# Patient Record
Sex: FEMALE | Race: WHITE | NOT HISPANIC OR LATINO | Employment: FULL TIME | URBAN - METROPOLITAN AREA
[De-identification: names, ages, dates, MRNs, and addresses within clinical notes are randomized per-mention and may not be internally consistent; named-entity substitution may affect disease eponyms.]

---

## 2023-02-19 PROBLEM — S16.1XXA NECK STRAIN: Status: ACTIVE | Noted: 2023-02-19

## 2023-02-19 PROBLEM — E66.01 MORBID OBESITY (MULTI): Status: ACTIVE | Noted: 2023-02-19

## 2023-02-19 PROBLEM — M54.9 CHRONIC BACK PAIN: Status: ACTIVE | Noted: 2023-02-19
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
## 2023-02-19 PROBLEM — N88.2 STENOTIC CERVICAL OS: Status: ACTIVE | Noted: 2023-02-19

## 2023-02-19 PROBLEM — E78.5 HYPERLIPIDEMIA: Status: ACTIVE | Noted: 2023-02-19

## 2023-02-19 PROBLEM — E66.813 CLASS 3 SEVERE OBESITY DUE TO EXCESS CALORIES WITH SERIOUS COMORBIDITY AND BODY MASS INDEX (BMI) OF 40.0 TO 44.9 IN ADULT: Status: ACTIVE | Noted: 2023-02-19

## 2023-02-19 PROBLEM — M54.2 ACUTE NECK PAIN: Status: ACTIVE | Noted: 2023-02-19

## 2023-02-19 PROBLEM — M62.830 BACK SPASM: Status: ACTIVE | Noted: 2023-02-19

## 2023-02-19 PROBLEM — E03.9 HYPOTHYROIDISM: Status: ACTIVE | Noted: 2023-02-19

## 2023-02-19 PROBLEM — N92.6 IRREGULAR BLEEDING: Status: ACTIVE | Noted: 2023-02-19

## 2023-02-19 PROBLEM — M54.30 SCIATICA: Status: ACTIVE | Noted: 2023-02-19

## 2023-02-19 PROBLEM — R53.83 FATIGUE: Status: ACTIVE | Noted: 2023-02-19
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
## 2023-02-19 PROBLEM — D22.9 MULTIPLE NEVI: Status: ACTIVE | Noted: 2023-02-19

## 2023-02-19 PROBLEM — G47.19 EXCESSIVE DAYTIME SLEEPINESS: Status: ACTIVE | Noted: 2023-02-19

## 2023-02-19 PROBLEM — G47.30 SLEEP APNEA: Status: ACTIVE | Noted: 2023-02-19

## 2023-02-19 PROBLEM — G47.33 OSA (OBSTRUCTIVE SLEEP APNEA): Status: ACTIVE | Noted: 2023-02-19

## 2023-02-19 PROBLEM — N95.0 PMB (POSTMENOPAUSAL BLEEDING): Status: ACTIVE | Noted: 2023-02-19

## 2023-02-19 PROBLEM — G62.9 PERIPHERAL NEUROPATHY: Status: ACTIVE | Noted: 2023-02-19

## 2023-02-19 PROBLEM — R82.90 ABNORMAL URINALYSIS: Status: ACTIVE | Noted: 2023-02-19

## 2023-02-19 PROBLEM — G89.29 CHRONIC NECK PAIN: Status: ACTIVE | Noted: 2023-02-19

## 2023-02-19 PROBLEM — R74.8 ELEVATED LIVER ENZYMES: Status: ACTIVE | Noted: 2023-02-19

## 2023-02-19 PROBLEM — G89.29 CHRONIC BACK PAIN: Status: ACTIVE | Noted: 2023-02-19

## 2023-02-19 PROBLEM — E55.9 VITAMIN D DEFICIENCY: Status: ACTIVE | Noted: 2023-02-19

## 2023-02-19 PROBLEM — L29.9 PRURITUS OF SKIN: Status: ACTIVE | Noted: 2023-02-19

## 2023-02-19 PROBLEM — J45.998 POST-VIRAL REACTIVE AIRWAY DISEASE (HHS-HCC): Status: ACTIVE | Noted: 2023-02-19

## 2023-02-19 PROBLEM — M54.12 CERVICAL RADICULAR PAIN: Status: ACTIVE | Noted: 2023-02-19

## 2023-02-19 PROBLEM — M54.2 CHRONIC NECK PAIN: Status: ACTIVE | Noted: 2023-02-19

## 2023-02-19 PROBLEM — R07.89 ATYPICAL CHEST PAIN: Status: ACTIVE | Noted: 2023-02-19

## 2023-02-19 PROBLEM — R06.83 SNORING: Status: ACTIVE | Noted: 2023-02-19

## 2023-02-19 PROBLEM — R79.89 ELEVATED LIVER FUNCTION TESTS: Status: ACTIVE | Noted: 2023-02-19

## 2023-02-19 PROBLEM — L24.9 IRRITANT CONTACT DERMATITIS: Status: ACTIVE | Noted: 2023-02-19

## 2023-02-19 PROBLEM — E11.9 DIABETES MELLITUS, TYPE 2 (MULTI): Status: ACTIVE | Noted: 2023-02-19

## 2023-02-19 PROBLEM — M25.569 KNEE PAIN: Status: ACTIVE | Noted: 2023-02-19

## 2023-02-19 PROBLEM — E66.01 CLASS 3 SEVERE OBESITY DUE TO EXCESS CALORIES WITH SERIOUS COMORBIDITY AND BODY MASS INDEX (BMI) OF 40.0 TO 44.9 IN ADULT (MULTI): Status: ACTIVE | Noted: 2023-02-19

## 2023-02-19 PROBLEM — S30.861A TICK BITE OF ABDOMEN: Status: ACTIVE | Noted: 2023-02-19

## 2023-02-19 PROBLEM — E11.9 TYPE 2 DIABETES MELLITUS (MULTI): Status: ACTIVE | Noted: 2023-02-19

## 2023-02-19 PROBLEM — R40.0 HAS DAYTIME DROWSINESS: Status: ACTIVE | Noted: 2023-02-19

## 2023-02-19 PROBLEM — R03.0 ELEVATED BP WITHOUT DIAGNOSIS OF HYPERTENSION: Status: ACTIVE | Noted: 2023-02-19

## 2023-02-19 RX ORDER — LEVOTHYROXINE SODIUM 125 UG/1
TABLET ORAL
COMMUNITY
Start: 2014-12-09 | End: 2023-03-10 | Stop reason: SDUPTHER

## 2023-02-19 RX ORDER — LOSARTAN POTASSIUM 25 MG/1
1 TABLET ORAL EVERY MORNING
COMMUNITY
Start: 2021-07-12 | End: 2023-03-10 | Stop reason: SDUPTHER

## 2023-02-19 RX ORDER — BLOOD-GLUCOSE METER
EACH MISCELLANEOUS
COMMUNITY
Start: 2020-03-11 | End: 2024-03-15 | Stop reason: WASHOUT

## 2023-02-19 RX ORDER — DULAGLUTIDE 3 MG/.5ML
1 INJECTION, SOLUTION SUBCUTANEOUS
COMMUNITY
Start: 2020-06-12 | End: 2023-03-10 | Stop reason: SDUPTHER

## 2023-02-19 RX ORDER — CYCLOBENZAPRINE HCL 10 MG
TABLET ORAL
COMMUNITY
Start: 2021-09-12 | End: 2023-03-10 | Stop reason: SDUPTHER

## 2023-02-19 RX ORDER — ERGOCALCIFEROL 1.25 MG/1
CAPSULE ORAL
COMMUNITY
Start: 2022-07-22

## 2023-02-19 RX ORDER — ATORVASTATIN CALCIUM 80 MG/1
1 TABLET, FILM COATED ORAL NIGHTLY
COMMUNITY
Start: 2019-03-19 | End: 2023-03-10 | Stop reason: SDUPTHER

## 2023-02-19 RX ORDER — METFORMIN HYDROCHLORIDE 500 MG/1
2 TABLET, EXTENDED RELEASE ORAL 2 TIMES DAILY
COMMUNITY
Start: 2019-03-19 | End: 2023-03-10 | Stop reason: SDUPTHER

## 2023-03-10 ENCOUNTER — OFFICE VISIT (OUTPATIENT)
Dept: PRIMARY CARE | Facility: CLINIC | Age: 63
End: 2023-03-10
Payer: COMMERCIAL

## 2023-03-10 VITALS
BODY MASS INDEX: 41.12 KG/M2 | HEIGHT: 57 IN | HEART RATE: 71 BPM | RESPIRATION RATE: 15 BRPM | TEMPERATURE: 98 F | SYSTOLIC BLOOD PRESSURE: 110 MMHG | WEIGHT: 190.6 LBS | DIASTOLIC BLOOD PRESSURE: 62 MMHG | OXYGEN SATURATION: 97 %

## 2023-03-10 DIAGNOSIS — M54.2 CERVICAL PAIN: ICD-10-CM

## 2023-03-10 DIAGNOSIS — E03.8 HYPOTHYROIDISM DUE TO HASHIMOTO'S THYROIDITIS: ICD-10-CM

## 2023-03-10 DIAGNOSIS — E66.01 CLASS 3 SEVERE OBESITY DUE TO EXCESS CALORIES WITH SERIOUS COMORBIDITY AND BODY MASS INDEX (BMI) OF 40.0 TO 44.9 IN ADULT (MULTI): ICD-10-CM

## 2023-03-10 DIAGNOSIS — E06.3 HYPOTHYROIDISM DUE TO HASHIMOTO'S THYROIDITIS: ICD-10-CM

## 2023-03-10 DIAGNOSIS — E11.69 TYPE 2 DIABETES MELLITUS WITH OTHER SPECIFIED COMPLICATION, WITHOUT LONG-TERM CURRENT USE OF INSULIN (MULTI): ICD-10-CM

## 2023-03-10 DIAGNOSIS — F43.10 PTSD (POST-TRAUMATIC STRESS DISORDER): Primary | ICD-10-CM

## 2023-03-10 LAB
POC FINGERSTICK BLOOD GLUCOSE: 139 MG/DL (ref 70–100)
POC HEMOGLOBIN A1C: 7 % (ref 4.2–6.5)

## 2023-03-10 PROCEDURE — 3008F BODY MASS INDEX DOCD: CPT | Performed by: NURSE PRACTITIONER

## 2023-03-10 PROCEDURE — 1036F TOBACCO NON-USER: CPT | Performed by: NURSE PRACTITIONER

## 2023-03-10 PROCEDURE — 3078F DIAST BP <80 MM HG: CPT | Performed by: NURSE PRACTITIONER

## 2023-03-10 PROCEDURE — 83036 HEMOGLOBIN GLYCOSYLATED A1C: CPT | Performed by: NURSE PRACTITIONER

## 2023-03-10 PROCEDURE — 82962 GLUCOSE BLOOD TEST: CPT | Performed by: NURSE PRACTITIONER

## 2023-03-10 PROCEDURE — 4010F ACE/ARB THERAPY RXD/TAKEN: CPT | Performed by: NURSE PRACTITIONER

## 2023-03-10 PROCEDURE — 99214 OFFICE O/P EST MOD 30 MIN: CPT | Performed by: NURSE PRACTITIONER

## 2023-03-10 PROCEDURE — 3074F SYST BP LT 130 MM HG: CPT | Performed by: NURSE PRACTITIONER

## 2023-03-10 RX ORDER — DULAGLUTIDE 3 MG/.5ML
INJECTION, SOLUTION SUBCUTANEOUS
Qty: 12.85 ML | Refills: 2 | Status: SHIPPED | OUTPATIENT
Start: 2023-03-10 | End: 2023-11-22

## 2023-03-10 RX ORDER — METFORMIN HYDROCHLORIDE 500 MG/1
500 TABLET, EXTENDED RELEASE ORAL
Qty: 90 TABLET | Refills: 1 | Status: SHIPPED | OUTPATIENT
Start: 2023-03-10 | End: 2023-11-22

## 2023-03-10 RX ORDER — LOSARTAN POTASSIUM 25 MG/1
25 TABLET ORAL EVERY MORNING
Qty: 90 TABLET | Refills: 1 | Status: SHIPPED | OUTPATIENT
Start: 2023-03-10 | End: 2023-11-22

## 2023-03-10 RX ORDER — ATORVASTATIN CALCIUM 80 MG/1
80 TABLET, FILM COATED ORAL NIGHTLY
Qty: 90 TABLET | Refills: 1 | Status: SHIPPED | OUTPATIENT
Start: 2023-03-10 | End: 2023-11-22

## 2023-03-10 RX ORDER — LEVOTHYROXINE SODIUM 125 UG/1
125 TABLET ORAL
Qty: 90 TABLET | Refills: 1 | Status: SHIPPED | OUTPATIENT
Start: 2023-03-10 | End: 2023-11-22

## 2023-03-10 RX ORDER — CYCLOBENZAPRINE HCL 10 MG
10 TABLET ORAL ONCE AS NEEDED
Qty: 90 TABLET | Refills: 1 | Status: SHIPPED | OUTPATIENT
Start: 2023-03-10 | End: 2023-03-27 | Stop reason: ENTERED-IN-ERROR

## 2023-03-10 ASSESSMENT — PATIENT HEALTH QUESTIONNAIRE - PHQ9
SUM OF ALL RESPONSES TO PHQ9 QUESTIONS 1 AND 2: 2
2. FEELING DOWN, DEPRESSED OR HOPELESS: SEVERAL DAYS
1. LITTLE INTEREST OR PLEASURE IN DOING THINGS: SEVERAL DAYS

## 2023-03-10 NOTE — PROGRESS NOTES
"Subjective   Patient ID: Cora Acosta is a 62 y.o. female who presents for Hypertension.    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   Constitutional:       Appearance: Normal appearance.   HENT:      Head: Normocephalic and atraumatic.      Right Ear: Tympanic membrane normal.      Left Ear: Tympanic membrane normal.      Nose: Nose normal.      Mouth/Throat:      Mouth: Mucous membranes are moist.   Eyes:      Extraocular Movements: Extraocular movements intact.      Conjunctiva/sclera: Conjunctivae normal.   Cardiovascular:      Rate and Rhythm: Normal rate and regular rhythm.      Pulses: Normal pulses.      Heart sounds: Normal heart sounds.   Pulmonary:      Effort: Pulmonary effort is normal.      Breath sounds: Normal breath sounds.   Abdominal:      General: Abdomen is flat. Bowel sounds are normal.      Palpations: Abdomen is soft.   Musculoskeletal:         General: Normal range of motion.      Cervical back: Normal range of motion and neck supple.   Skin:     General: Skin is warm and dry.   Neurological:      " General: No focal deficit present.      Mental Status: She is alert and oriented to person, place, and time. Mental status is at baseline.   Psychiatric:         Mood and Affect: Mood normal.         Behavior: Behavior normal.         Thought Content: Thought content normal.         Judgment: Judgment normal.     Assessment/Plan   Problem List Items Addressed This Visit          Endocrine/Metabolic    Diabetes mellitus, type 2 (CMS/HCC)    Relevant Medications    metFORMIN XR (Glucophage-XR) 500 mg 24 hr tablet    losartan (Cozaar) 25 mg tablet    dulaglutide (Trulicity) 3 mg/0.5 mL pen injector    atorvastatin (Lipitor) 80 mg tablet    Other Relevant Orders    POCT glycosylated hemoglobin (Hb A1C) manually resulted (Completed)    POCT Fingerstick glucose manually resulted (Completed)    Comprehensive Metabolic Panel    Cholesterol, LDL Direct    Urinalysis with Reflex Microscopic    Albumin , Urine Random    Follow Up In [facility] - PCP    Hypothyroidism    Relevant Medications    levothyroxine (Synthroid, Levoxyl) 125 mcg tablet    Other Relevant Orders    Follow Up In [facility] - PCP     Other Visit Diagnoses       PTSD (post-traumatic stress disorder)    -  Primary    Relevant Orders    Referral to Psychology    Follow Up In [facility] - PCP    Cervical pain        Relevant Medications    cyclobenzaprine (Flexeril) 10 mg tablet    Other Relevant Orders    Follow Up In [facility] - PCP

## 2023-03-10 NOTE — PATIENT INSTRUCTIONS
Think about the things you can be grateful for  Think about the future   Journal   Connect with people that love you   Your aic is 7 which is great  Going through your past is hard especially if parents have not been what they were supposed to be   You can do zoom calls  Look at the future which is always better than the past    Follow up in 4 to 6 months     There is a same day ortho injury clinic at [facility] the [facility] [address]  from [phone] to [phone]

## 2023-03-23 ENCOUNTER — TELEPHONE (OUTPATIENT)
Dept: PRIMARY CARE | Facility: CLINIC | Age: 63
End: 2023-03-23
Payer: COMMERCIAL

## 2023-03-23 DIAGNOSIS — J45.998 POST-VIRAL REACTIVE AIRWAY DISEASE (HHS-HCC): Primary | ICD-10-CM

## 2023-03-27 ENCOUNTER — TELEPHONE (OUTPATIENT)
Dept: PRIMARY CARE | Facility: CLINIC | Age: 63
End: 2023-03-27

## 2023-03-27 RX ORDER — CYCLOBENZAPRINE HCL 10 MG
10 TABLET ORAL NIGHTLY PRN
Qty: 30 TABLET | Refills: 1 | Status: SHIPPED | OUTPATIENT
Start: 2023-03-27 | End: 2024-03-15

## 2023-03-27 RX ORDER — ALBUTEROL SULFATE 90 UG/1
2 AEROSOL, METERED RESPIRATORY (INHALATION) EVERY 6 HOURS PRN
Qty: 18 G | Refills: 11 | Status: SHIPPED | OUTPATIENT
Start: 2023-03-27 | End: 2024-03-26

## 2023-03-27 NOTE — TELEPHONE ENCOUNTER
----- Message from Arlette Centeno MD sent at 3/27/2023 12:49 PM EDT -----  Regarding: RE: refill  I sent the albuterol inhaler to her pharmacy however let her know if she is having trouble breathing despite using albuterol inhaler she needs to be seen at emergency room or urgent care.  ----- Message -----  From: Lynne Soliman  Sent: 3/27/2023  12:34 PM EDT  To: Arlette Centeno MD  Subject: refill                                           Patient of Carlie    Patient stated that her son and her  has Covid and she has reactive airway asthma and is experiencing breathing issues that may be associated with COVID. Patient is requesting an inhaler she stated that she has not had an inhaler for 3 years. She previously used Albuterol.
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
## 2023-10-25 ENCOUNTER — APPOINTMENT (OUTPATIENT)
Dept: PRIMARY CARE | Facility: CLINIC | Age: 63
End: 2023-10-25
Payer: COMMERCIAL

## 2023-10-25 ENCOUNTER — TELEPHONE (OUTPATIENT)
Dept: PRIMARY CARE | Facility: CLINIC | Age: 63
End: 2023-10-25

## 2023-10-25 DIAGNOSIS — S90.219A CONTUSION OF GREAT TOE WITH DAMAGE TO NAIL, UNSPECIFIED LATERALITY, INITIAL ENCOUNTER: Primary | ICD-10-CM

## 2023-10-25 NOTE — TELEPHONE ENCOUNTER
" Pls call pt  Referral placed for podiatry  2. For med switch she needs an appt to discuss  3. Pls get more info for tick/ lymes  -when was exposure  - when she says \"exposure\" does that mean a tick was on her skin?  - Was she w/ a rash after tick exposure  - any symptoms of lyme's      "
"2. For med switch she needs an appt to discuss    Patient is requesting a 7:40 appt with you and does not want to wait for this. Please advise.      3. Pls get more info for tick/ lymes  -when was exposure----During Summer  - when she says \"exposure\" does that mean a tick was on her skin?-----She found several on her skin at different times.  - Was she w/ a rash after tick exposure-----no rash  - any symptoms of lyme's----aches, pains, headaches, fatigue  "
Patient came into the office. She was scheduled to see Carlie Rosa, today. Her appointment is rescheduled for 2/7/24. Out of the following medications:  atorvastatin (Lipitor) 80 mg tablet   levothyroxine (Synthroid, Levoxyl) 125 mcg tablet   [id number] - [facility] [address]   Phone: [phone]   Fax: [phone]   Please, refill.  
Patient was scheduled to see lanette Watson. She requests a  podiatry referral. Her great right toenail is coming off and she has ingrown toenails. Patient stated she was exposed to ticks and her son was diagnosed with lyme disease a month ago. She would like to be tested for lyme disease. Also, she would like to switch from Trulicity to Ozempic. If the switch is allowed, does she still have to take Metformin.   
No

## 2023-11-21 DIAGNOSIS — E03.8 HYPOTHYROIDISM DUE TO HASHIMOTO'S THYROIDITIS: ICD-10-CM

## 2023-11-21 DIAGNOSIS — E06.3 HYPOTHYROIDISM DUE TO HASHIMOTO'S THYROIDITIS: ICD-10-CM

## 2023-11-21 DIAGNOSIS — E11.69 TYPE 2 DIABETES MELLITUS WITH OTHER SPECIFIED COMPLICATION, WITHOUT LONG-TERM CURRENT USE OF INSULIN (MULTI): ICD-10-CM

## 2023-11-21 NOTE — TELEPHONE ENCOUNTER
Patient was told that her prescriptions were being sent to the pharmacy. The medications were never sent to the pharmacy and they need to be sent asap. She is currently out of medications. Thank you in advance.

## 2023-11-22 RX ORDER — LOSARTAN POTASSIUM 25 MG/1
25 TABLET ORAL EVERY MORNING
Qty: 90 TABLET | Refills: 0 | Status: SHIPPED | OUTPATIENT
Start: 2023-11-22

## 2023-11-22 RX ORDER — DULAGLUTIDE 3 MG/.5ML
INJECTION, SOLUTION SUBCUTANEOUS
Qty: 2 ML | Refills: 0 | Status: SHIPPED | OUTPATIENT
Start: 2023-11-22 | End: 2024-01-19 | Stop reason: SDUPTHER

## 2023-11-22 RX ORDER — LEVOTHYROXINE SODIUM 125 UG/1
TABLET ORAL
Qty: 90 TABLET | Refills: 0 | Status: SHIPPED | OUTPATIENT
Start: 2023-11-22 | End: 2024-02-20 | Stop reason: SDUPTHER

## 2023-11-22 RX ORDER — METFORMIN HYDROCHLORIDE 500 MG/1
TABLET, EXTENDED RELEASE ORAL
Qty: 90 TABLET | Refills: 0 | Status: SHIPPED | OUTPATIENT
Start: 2023-11-22 | End: 2024-03-26 | Stop reason: ALTCHOICE

## 2023-11-22 RX ORDER — ATORVASTATIN CALCIUM 80 MG/1
80 TABLET, FILM COATED ORAL NIGHTLY
Qty: 90 TABLET | Refills: 0 | Status: SHIPPED | OUTPATIENT
Start: 2023-11-22

## 2023-12-05 ENCOUNTER — OFFICE VISIT (OUTPATIENT)
Dept: PAIN MEDICINE | Facility: HOSPITAL | Age: 63
End: 2023-12-05
Payer: COMMERCIAL

## 2023-12-05 DIAGNOSIS — M54.12 CERVICAL RADICULAR PAIN: ICD-10-CM

## 2023-12-05 DIAGNOSIS — M25.529 ELBOW PAIN, UNSPECIFIED LATERALITY: ICD-10-CM

## 2023-12-05 PROCEDURE — 99214 OFFICE O/P EST MOD 30 MIN: CPT | Performed by: ANESTHESIOLOGY

## 2023-12-05 PROCEDURE — 3008F BODY MASS INDEX DOCD: CPT | Performed by: ANESTHESIOLOGY

## 2023-12-05 PROCEDURE — 4010F ACE/ARB THERAPY RXD/TAKEN: CPT | Performed by: ANESTHESIOLOGY

## 2023-12-05 PROCEDURE — 1036F TOBACCO NON-USER: CPT | Performed by: ANESTHESIOLOGY

## 2023-12-05 ASSESSMENT — PAIN SCALES - GENERAL: PAINLEVEL_OUTOF10: 6

## 2023-12-05 ASSESSMENT — PAIN - FUNCTIONAL ASSESSMENT: PAIN_FUNCTIONAL_ASSESSMENT: 0-10
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
## 2023-12-15 DIAGNOSIS — M62.830 BACK SPASM: ICD-10-CM

## 2023-12-15 RX ORDER — TRAMADOL HYDROCHLORIDE 50 MG/1
50 TABLET ORAL 3 TIMES DAILY PRN
Qty: 21 TABLET | Refills: 0 | Status: SHIPPED | OUTPATIENT
Start: 2023-12-15 | End: 2023-12-22

## 2023-12-22 ENCOUNTER — HOSPITAL ENCOUNTER (OUTPATIENT)
Dept: RADIOLOGY | Facility: HOSPITAL | Age: 63
Discharge: HOME | End: 2023-12-22
Payer: COMMERCIAL

## 2023-12-22 VITALS
RESPIRATION RATE: 16 BRPM | HEIGHT: 56 IN | OXYGEN SATURATION: 96 % | HEART RATE: 69 BPM | SYSTOLIC BLOOD PRESSURE: 134 MMHG | TEMPERATURE: 96.5 F | BODY MASS INDEX: 42.73 KG/M2 | DIASTOLIC BLOOD PRESSURE: 71 MMHG

## 2023-12-22 DIAGNOSIS — M54.12 CERVICAL RADICULAR PAIN: ICD-10-CM

## 2023-12-22 PROCEDURE — 2500000004 HC RX 250 GENERAL PHARMACY W/ HCPCS (ALT 636 FOR OP/ED): Performed by: ANESTHESIOLOGY

## 2023-12-22 PROCEDURE — 62321 NJX INTERLAMINAR CRV/THRC: CPT | Performed by: ANESTHESIOLOGY

## 2023-12-22 PROCEDURE — 77003 FLUOROGUIDE FOR SPINE INJECT: CPT

## 2023-12-22 RX ORDER — MIDAZOLAM HYDROCHLORIDE 1 MG/ML
INJECTION INTRAMUSCULAR; INTRAVENOUS
Status: COMPLETED | OUTPATIENT
Start: 2023-12-22 | End: 2023-12-22

## 2023-12-22 RX ADMIN — MIDAZOLAM HYDROCHLORIDE 1 MG: 1 INJECTION INTRAMUSCULAR; INTRAVENOUS at 08:23

## 2023-12-22 ASSESSMENT — PAIN - FUNCTIONAL ASSESSMENT: PAIN_FUNCTIONAL_ASSESSMENT: 0-10

## 2023-12-22 ASSESSMENT — PAIN SCALES - GENERAL
PAINLEVEL_OUTOF10: 9
PAINLEVEL_OUTOF10: 10 - WORST POSSIBLE PAIN
PAINLEVEL_OUTOF10: 2
PAINLEVEL_OUTOF10: 10 - WORST POSSIBLE PAIN
PAINLEVEL_OUTOF10: 3
PAINLEVEL_OUTOF10: 3

## 2023-12-22 NOTE — PROCEDURES
Preoperative diagnosis:  Cervical radiculitis  Postoperative diagnosis:  Cervical radiculitis  Procedure: Right biased C6-7 cervical epidural steroid injection under fluoroscopic guidance  Surgeon: Shannan Bautista  Assistant:  Fellow, Simon Quevedo  Anesthesia: Local, IV sedation  Complications: Apparently none    Clinical note: Cora Acosta is a 63-year-old female with a history of neck pain and probably right-sided symptoms who presents today for the aforementioned procedure.    Procedure note: The patient was met in the preoperative holding area after risks benefits and alternatives to procedure were discussed with the patient, informed consent was obtained. Patient brought back to the procedure room and placed in the prone position on the fluoroscopy table. Area over the neck was exposed, prepped, draped, in the usual sterile fashion.  Skin and subcutaneous tissues to the cervical intralaminar space was anesthetized using 0.5% lidocaine.  An 18-gauge Tuohy needle was inserted in the skin and advanced into the interlaminar space. A glass syringe was used to achieve the epidural space using the loss resistance technique there seem to be false loss, tip position was checked again in the AP view and moved more medially.  In the final position contrast was injected which showed appropriate epidural spread, no intravascular or intrathecal uptake. A total of 1 mL's of 0.5% lidocaine mixed with 40 mg methylprednisolone was injected. Needle removed, bandage applied, patient tolerated the procedure well with no immediate complications.
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
## 2024-01-04 DIAGNOSIS — M54.12 CERVICAL RADICULAR PAIN: ICD-10-CM

## 2024-01-05 DIAGNOSIS — M54.12 CERVICAL RADICULAR PAIN: ICD-10-CM

## 2024-01-07 ENCOUNTER — HOSPITAL ENCOUNTER (OUTPATIENT)
Dept: RADIOLOGY | Facility: HOSPITAL | Age: 64
Discharge: HOME | End: 2024-01-07
Payer: COMMERCIAL

## 2024-01-07 DIAGNOSIS — M54.12 CERVICAL RADICULAR PAIN: ICD-10-CM

## 2024-01-07 PROCEDURE — 72141 MRI NECK SPINE W/O DYE: CPT | Performed by: RADIOLOGY

## 2024-01-07 PROCEDURE — 72141 MRI NECK SPINE W/O DYE: CPT

## 2024-01-18 DIAGNOSIS — M54.12 CERVICAL RADICULAR PAIN: ICD-10-CM

## 2024-01-19 ENCOUNTER — OFFICE VISIT (OUTPATIENT)
Dept: PRIMARY CARE | Facility: CLINIC | Age: 64
End: 2024-01-19
Payer: COMMERCIAL

## 2024-01-19 ENCOUNTER — LAB (OUTPATIENT)
Dept: LAB | Facility: LAB | Age: 64
End: 2024-01-19
Payer: COMMERCIAL

## 2024-01-19 ENCOUNTER — OFFICE VISIT (OUTPATIENT)
Dept: ORTHOPEDIC SURGERY | Facility: CLINIC | Age: 64
End: 2024-01-19
Payer: COMMERCIAL

## 2024-01-19 VITALS
TEMPERATURE: 97.3 F | RESPIRATION RATE: 15 BRPM | BODY MASS INDEX: 43.71 KG/M2 | HEIGHT: 56 IN | OXYGEN SATURATION: 98 % | SYSTOLIC BLOOD PRESSURE: 132 MMHG | WEIGHT: 194.3 LBS | HEART RATE: 78 BPM | DIASTOLIC BLOOD PRESSURE: 78 MMHG

## 2024-01-19 VITALS — WEIGHT: 190 LBS | BODY MASS INDEX: 42.74 KG/M2 | HEIGHT: 56 IN

## 2024-01-19 DIAGNOSIS — F43.10 PTSD (POST-TRAUMATIC STRESS DISORDER): ICD-10-CM

## 2024-01-19 DIAGNOSIS — E55.9 VITAMIN D DEFICIENCY: Primary | ICD-10-CM

## 2024-01-19 DIAGNOSIS — F41.9 ANXIETY: ICD-10-CM

## 2024-01-19 DIAGNOSIS — M54.50 ACUTE RIGHT-SIDED LOW BACK PAIN WITHOUT SCIATICA: ICD-10-CM

## 2024-01-19 DIAGNOSIS — E11.69 TYPE 2 DIABETES MELLITUS WITH OTHER SPECIFIED COMPLICATION, WITHOUT LONG-TERM CURRENT USE OF INSULIN (MULTI): ICD-10-CM

## 2024-01-19 DIAGNOSIS — F33.2 SEVERE EPISODE OF RECURRENT MAJOR DEPRESSIVE DISORDER, WITHOUT PSYCHOTIC FEATURES (MULTI): ICD-10-CM

## 2024-01-19 DIAGNOSIS — M54.12 CERVICAL RADICULAR PAIN: ICD-10-CM

## 2024-01-19 DIAGNOSIS — M25.551 PAIN OF RIGHT HIP: ICD-10-CM

## 2024-01-19 DIAGNOSIS — E03.9 HYPOTHYROIDISM, UNSPECIFIED TYPE: ICD-10-CM

## 2024-01-19 DIAGNOSIS — M25.551 RIGHT HIP PAIN: Primary | ICD-10-CM

## 2024-01-19 PROBLEM — M25.529 ELBOW PAIN: Status: ACTIVE | Noted: 2024-01-19
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
## 2024-01-19 PROBLEM — J45.998 POST-VIRAL REACTIVE AIRWAY DISEASE (HHS-HCC): Status: RESOLVED | Noted: 2023-02-19 | Resolved: 2024-01-19

## 2024-01-19 PROBLEM — R73.03 PREDIABETES: Status: ACTIVE | Noted: 2024-01-19

## 2024-01-19 PROBLEM — Z86.59 HISTORY OF DEPRESSION: Status: ACTIVE | Noted: 2024-01-19

## 2024-01-19 PROBLEM — D48.0: Status: ACTIVE | Noted: 2024-01-19

## 2024-01-19 LAB
ALBUMIN SERPL BCP-MCNC: 4.1 G/DL (ref 3.4–5)
ALP SERPL-CCNC: 85 U/L (ref 33–136)
ALT SERPL W P-5'-P-CCNC: 29 U/L (ref 7–45)
ANION GAP SERPL CALC-SCNC: 15 MMOL/L (ref 10–20)
AST SERPL W P-5'-P-CCNC: 24 U/L (ref 9–39)
BILIRUB SERPL-MCNC: 0.5 MG/DL (ref 0–1.2)
BUN SERPL-MCNC: 14 MG/DL (ref 6–23)
CALCIUM SERPL-MCNC: 9.1 MG/DL (ref 8.6–10.6)
CHLORIDE SERPL-SCNC: 101 MMOL/L (ref 98–107)
CO2 SERPL-SCNC: 25 MMOL/L (ref 21–32)
CREAT SERPL-MCNC: 0.75 MG/DL (ref 0.5–1.05)
EGFRCR SERPLBLD CKD-EPI 2021: 90 ML/MIN/1.73M*2
GLUCOSE SERPL-MCNC: 176 MG/DL (ref 74–99)
LDLC SERPL DIRECT ASSAY-MCNC: 301 MG/DL (ref 0–129)
POC HEMOGLOBIN A1C: 11.9 % (ref 4.2–6.5)
POTASSIUM SERPL-SCNC: 4.1 MMOL/L (ref 3.5–5.3)
PROT SERPL-MCNC: 7 G/DL (ref 6.4–8.2)
SODIUM SERPL-SCNC: 137 MMOL/L (ref 136–145)
TSH SERPL-ACNC: 89.3 MIU/L (ref 0.44–3.98)

## 2024-01-19 PROCEDURE — 4010F ACE/ARB THERAPY RXD/TAKEN: CPT | Performed by: FAMILY MEDICINE

## 2024-01-19 PROCEDURE — 3075F SYST BP GE 130 - 139MM HG: CPT | Performed by: FAMILY MEDICINE

## 2024-01-19 PROCEDURE — 99215 OFFICE O/P EST HI 40 MIN: CPT | Performed by: FAMILY MEDICINE

## 2024-01-19 PROCEDURE — 3078F DIAST BP <80 MM HG: CPT | Performed by: FAMILY MEDICINE

## 2024-01-19 PROCEDURE — 83721 ASSAY OF BLOOD LIPOPROTEIN: CPT

## 2024-01-19 PROCEDURE — 3008F BODY MASS INDEX DOCD: CPT | Performed by: FAMILY MEDICINE

## 2024-01-19 PROCEDURE — 36415 COLL VENOUS BLD VENIPUNCTURE: CPT

## 2024-01-19 PROCEDURE — 83036 HEMOGLOBIN GLYCOSYLATED A1C: CPT | Performed by: FAMILY MEDICINE

## 2024-01-19 PROCEDURE — 3050F LDL-C >= 130 MG/DL: CPT | Performed by: ORTHOPAEDIC SURGERY

## 2024-01-19 PROCEDURE — 3008F BODY MASS INDEX DOCD: CPT | Performed by: ORTHOPAEDIC SURGERY

## 2024-01-19 PROCEDURE — 84443 ASSAY THYROID STIM HORMONE: CPT

## 2024-01-19 PROCEDURE — 99203 OFFICE O/P NEW LOW 30 MIN: CPT | Performed by: ORTHOPAEDIC SURGERY

## 2024-01-19 PROCEDURE — 4010F ACE/ARB THERAPY RXD/TAKEN: CPT | Performed by: ORTHOPAEDIC SURGERY

## 2024-01-19 PROCEDURE — 1036F TOBACCO NON-USER: CPT | Performed by: FAMILY MEDICINE

## 2024-01-19 PROCEDURE — 80053 COMPREHEN METABOLIC PANEL: CPT

## 2024-01-19 PROCEDURE — 1036F TOBACCO NON-USER: CPT | Performed by: ORTHOPAEDIC SURGERY

## 2024-01-19 PROCEDURE — 99417 PROLNG OP E/M EACH 15 MIN: CPT | Performed by: FAMILY MEDICINE

## 2024-01-19 RX ORDER — DULAGLUTIDE 3 MG/.5ML
INJECTION, SOLUTION SUBCUTANEOUS
Qty: 2 ML | Refills: 0 | Status: SHIPPED | OUTPATIENT
Start: 2024-01-19 | End: 2024-02-05 | Stop reason: DRUGHIGH

## 2024-01-19 RX ORDER — DULOXETIN HYDROCHLORIDE 30 MG/1
CAPSULE, DELAYED RELEASE ORAL
Qty: 180 CAPSULE | Refills: 0 | Status: SHIPPED | OUTPATIENT
Start: 2024-01-19 | End: 2024-05-22 | Stop reason: WASHOUT

## 2024-01-19 RX ORDER — CYANOCOBALAMIN (VITAMIN B-12) 500 MCG
TABLET ORAL
COMMUNITY

## 2024-01-19 ASSESSMENT — PATIENT HEALTH QUESTIONNAIRE - PHQ9
5. POOR APPETITE OR OVEREATING: NEARLY EVERY DAY
9. THOUGHTS THAT YOU WOULD BE BETTER OFF DEAD, OR OF HURTING YOURSELF: SEVERAL DAYS
4. FEELING TIRED OR HAVING LITTLE ENERGY: NEARLY EVERY DAY
7. TROUBLE CONCENTRATING ON THINGS, SUCH AS READING THE NEWSPAPER OR WATCHING TELEVISION: NEARLY EVERY DAY
6. FEELING BAD ABOUT YOURSELF - OR THAT YOU ARE A FAILURE OR HAVE LET YOURSELF OR YOUR FAMILY DOWN: NEARLY EVERY DAY
10. IF YOU CHECKED OFF ANY PROBLEMS, HOW DIFFICULT HAVE THESE PROBLEMS MADE IT FOR YOU TO DO YOUR WORK, TAKE CARE OF THINGS AT HOME, OR GET ALONG WITH OTHER PEOPLE: VERY DIFFICULT
3. TROUBLE FALLING OR STAYING ASLEEP OR SLEEPING TOO MUCH: MORE THAN HALF THE DAYS
8. MOVING OR SPEAKING SO SLOWLY THAT OTHER PEOPLE COULD HAVE NOTICED. OR THE OPPOSITE, BEING SO FIGETY OR RESTLESS THAT YOU HAVE BEEN MOVING AROUND A LOT MORE THAN USUAL: NEARLY EVERY DAY
2. FEELING DOWN, DEPRESSED OR HOPELESS: MORE THAN HALF THE DAYS
1. LITTLE INTEREST OR PLEASURE IN DOING THINGS: MORE THAN HALF THE DAYS
SUM OF ALL RESPONSES TO PHQ QUESTIONS 1-9: 22
SUM OF ALL RESPONSES TO PHQ9 QUESTIONS 1 AND 2: 4

## 2024-01-19 ASSESSMENT — PAIN - FUNCTIONAL ASSESSMENT: PAIN_FUNCTIONAL_ASSESSMENT: 0-10

## 2024-01-19 ASSESSMENT — PAIN SCALES - GENERAL: PAINLEVEL_OUTOF10: 7

## 2024-01-19 NOTE — LETTER
January 23, 2024     Loulou Rosa, APRN-CNP  [address]   [address]  [facility] [address]    Patient: Cora Acosta   YOB: 1960   Date of Visit: 1/19/2024       Dear Dr. Loulou Rosa, APRN-CNP:    Thank you for referring Cora Acosta to me for evaluation. Below are my notes for this consultation.  If you have questions, please do not hesitate to call me. I look forward to following your patient along with you.       Sincerely,     iMke Griffiths MD      CC: Shannan Bautista MD  ______________________________________________________________________________________    HPI:Cora Acosta is a 63-year-old woman, who comes in today, with approximately 2-month history of pain in her right shoulder.  The pain then radiates into the arms bilaterally with complaints of weakness.  She had a steroid injection with pain management.  She has not had physical therapy.  She denies any overt myelopathic symptoms.      ROS:  Reviewed on EMR and patient intake sheet.    PMH/SH:   Reviewed on EMR and patient intake sheet.    Exam:  Physical Exam    Constitutional: Well appearing; no acute distress  Eyes: pupils are equal and round  Psych: normal affect  Respiratory: non-labored breathing  Cardiovascular: regular rate and rhythm  GI: non-distended abdomen  Musculoskeletal: no pain with range of motion of the shoulders bilaterally; no signs of impingement  Neurologic: [4]/5 strength in the upper extremities bilaterally]; [negative Humphreys's]; [no hyper-reflexia]; negative Spurling's    Radiology:  MRI was personally reviewed.  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  She does have some subaxial kyphosis and moderate disc degeneration at C5-6 and C6-C7.  No severe cord compression noted.    Diagnosis:  Cervical radiculopathy    Assessment and Plan:   63-year-old woman, with cervical radiculopathy.  Currently, she is not myelopathic and her MRI does not demonstrate severe cord compression.  I recommended  physical therapy.  If her radicular symptoms persist and remain intolerable, then I will see her back to discuss any potential surgical intervention.    The patient was in agreement with the plan. At the end of the visit today, the patient felt that all questions had been answered satisfactorily.  The patient was pleased with the visit and very appreciative for the care rendered.     Thank you very much for the kind referral.  It is a privilege, and a pleasure, to partner with you in the care of your patients.  I would be delighted to assist you with any further consultations as needed.      Mike Griffiths MD    Chief of Spine Surgery, [facility]  Director of Spine Service, [facility]  , Department of Orthopaedics  [facility]  [address] [place] [address]  P: [phone]    This note was dictated with voice recognition software.  It has not been proofread for grammatical errors, typographical mistakes or other semantic inconsistencies.

## 2024-01-19 NOTE — PROGRESS NOTES
"Subjective   Patient ID: Cora Acosta is a 63 y.o. female who presents for Follow-up (Pt is following up a physical therapy referral for her right hip injury.).    HPI       Right hip/ back pain  Started  1-2 wks ago while at work  Started after getting a into a chair and felt her hip \"pop\" with pain  Now with pain in the lower glutal area/ back  Would like to try PT  Using cane which helps with balance and pain    DM  Hba1c 11.9 ( up from 7 on 3/7/23)  States has been with more depression and not using her medications as directed.  She is ready to restart    Depression worsened  Several illness in the family  Work is stressful as well  Denies any suicidal thoughts or ideas  Willing to start counseling as well as medications for help      Review of Systems  All systems reviewed and neg if not noted in the HPI above       Objective   /78 (Patient Position: Sitting)   Pulse 78   Temp 36.3 °C (97.3 °F)   Resp 15   Ht 1.422 m (4' 8\")   Wt 88.1 kg (194 lb 4.8 oz)   SpO2 98%   BMI 43.56 kg/m²     Physical Exam    Patient appears to be in mild to moderate pain, non-antalgic gait noted.   paraspinal muscles NTTP   No masses.  Painful and reduced LS ROM noted.   Straight leg raise is neg   TTP at the right gluteal area  Did not do heel toe  DTR's, motor strength and sensation normal.    Peripheral pulses are palpable.    Assessment/Plan   Problem List Items Addressed This Visit             ICD-10-CM    Diabetes mellitus, type 2 (CMS/HCC) E11.9    Relevant Medications  -Hemoglobin A1c increased to 11  - [facility] pharm referral  - restart dm meds    -refilled dulaglutide (Trulicity) 3 mg/0.5 mL pen injector    Other Relevant Orders    POCT glycosylated hemoglobin (Hb A1C) manually resulted (Completed)    Follow Up In [facility] - Pharmacy    Hypothyroidism E03.9    Relevant Orders    -Last thyroid function was abnormal and checked 2021   -Recheck TSH    Anxiety F41.9    Relevant Medications  - Placed a " referral to see the Psychology team.  - Please check out [email or web address] to find your very own counselor you would feel comfortable with.  -Today I taught you how to navigate the [email or web address] website so you can pick out your therapist    -Starting on DULoxetine (Cymbalta) 30 mg DR capsule    Other Relevant Orders    Referral to Psychology    TSH    Severe episode of recurrent major depressive disorder, without psychotic features (CMS/HCC) F33.2    Relevant Medications    - Placed a referral to see the Psychology team.  - Please check out [email or web address] to find your very own counselor you would feel comfortable with.  -Today I taught you how to navigate the [email or web address] website so you can pick out your therapist  -Starting  DULoxetine (Cymbalta) 30 mg DR capsule    Other Relevant Orders    Referral to Psychology    PTSD (post-traumatic stress disorder) F43.10    Relevant Medications    DULoxetine (Cymbalta) 30 mg DR capsule    Other Relevant Orders    Referral to Psychology    TSH     Other Visit Diagnoses         Codes    Right hip pain    -  Primary  -Needs Workmen's Comp. M25.551    Relevant Orders    -Referral to Occupational Medicine to help with Workmen's Comp.    Pain of right hip     M25.551    Relevant Orders    XR hip right with pelvis when performed 2 or 3 views    Acute right-sided low back pain without sciatica     M54.50    Relevant Orders    XR hip right with pelvis when performed 2 or 3 views    Referral to Physical Therapy  -Duloxetine will help with pain as well

## 2024-01-19 NOTE — PATIENT INSTRUCTIONS
Back pain  - PT ordered  - xray ordered  - Topical creams- Voltaren gel, Salonpas, Icy hot, Bio freeze, Capsaicin, Asper cream, or Tiger balm (these are all over the counter)   - ortho if not better  - Add duloxetine  - referral for occupation medication  for help with workens comp    Labs today    Anxiety/Depression/PTSD  - Placed a referral to see the Psychology team.  - Please check out [email or web address] to find your very own counselor you would feel comfortable with.  -Today I taught you how to navigate the [email or web address] website so you can pick out your therapist    Dm  - [facility] pharm referral  - restart dm meds        Please follow up in as Scheduled with PCP or as needed.       ** If labs or imaging ordered at today's visit, all the non-urgent results will be discussed at your next visit    If you have been referred for a special test or to a specialist please call  [phone] to schedule an appointment.  If you have any further questions, or if develop new or worsened symptoms, please give our office a call at [phone].

## 2024-01-22 DIAGNOSIS — E03.9 HYPOTHYROIDISM, UNSPECIFIED TYPE: Primary | ICD-10-CM

## 2024-01-23 NOTE — PROGRESS NOTES
HPI:Cora Acosta is a 63-year-old woman, who comes in today, with approximately 2-month history of pain in her right shoulder.  The pain then radiates into the arms bilaterally with complaints of weakness.  She had a steroid injection with pain management.  She has not had physical therapy.  She denies any overt myelopathic symptoms.      ROS:  Reviewed on EMR and patient intake sheet.    PMH/SH:   Reviewed on EMR and patient intake sheet.    Exam:  Physical Exam    Constitutional: Well appearing; no acute distress  Eyes: pupils are equal and round  Psych: normal affect  Respiratory: non-labored breathing  Cardiovascular: regular rate and rhythm  GI: non-distended abdomen  Musculoskeletal: no pain with range of motion of the shoulders bilaterally; no signs of impingement  Neurologic: [4]/5 strength in the upper extremities bilaterally]; [negative Humphreys's]; [no hyper-reflexia]; negative Spurling's    Radiology:  MRI was personally reviewed.  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  She does have some subaxial kyphosis and moderate disc degeneration at C5-6 and C6-C7.  No severe cord compression noted.    Diagnosis:  Cervical radiculopathy    Assessment and Plan:   63-year-old woman, with cervical radiculopathy.  Currently, she is not myelopathic and her MRI does not demonstrate severe cord compression.  I recommended physical therapy.  If her radicular symptoms persist and remain intolerable, then I will see her back to discuss any potential surgical intervention.    The patient was in agreement with the plan. At the end of the visit today, the patient felt that all questions had been answered satisfactorily.  The patient was pleased with the visit and very appreciative for the care rendered.     Thank you very much for the kind referral.  It is a privilege, and a pleasure, to partner with you in the care of your patients.  I would be delighted to assist you with any further consultations as needed.      Mike Dawson  MD Aye    Chief of Spine Surgery, [facility]  Director of Spine Service, [facility]  , Department of Orthopaedics  [facility] [employer]  [address] [place] [address]  P: [phone]    This note was dictated with voice recognition software.  It has not been proofread for grammatical errors, typographical mistakes or other semantic inconsistencies.

## 2024-01-25 ENCOUNTER — OFFICE VISIT (OUTPATIENT)
Dept: ORTHOPEDIC SURGERY | Facility: CLINIC | Age: 64
End: 2024-01-25
Payer: COMMERCIAL

## 2024-01-25 ENCOUNTER — HOSPITAL ENCOUNTER (OUTPATIENT)
Dept: RADIOLOGY | Facility: CLINIC | Age: 64
Discharge: HOME | End: 2024-01-25
Payer: COMMERCIAL

## 2024-01-25 VITALS — HEIGHT: 56 IN | BODY MASS INDEX: 42.74 KG/M2 | WEIGHT: 190 LBS

## 2024-01-25 DIAGNOSIS — M54.2 NECK PAIN: ICD-10-CM

## 2024-01-25 DIAGNOSIS — M54.12 CERVICAL RADICULOPATHY: Primary | ICD-10-CM

## 2024-01-25 DIAGNOSIS — M54.12 CERVICAL RADICULOPATHY: ICD-10-CM

## 2024-01-25 PROCEDURE — 72050 X-RAY EXAM NECK SPINE 4/5VWS: CPT | Performed by: RADIOLOGY

## 2024-01-25 PROCEDURE — 99204 OFFICE O/P NEW MOD 45 MIN: CPT | Performed by: ORTHOPAEDIC SURGERY

## 2024-01-25 PROCEDURE — 99214 OFFICE O/P EST MOD 30 MIN: CPT | Performed by: ORTHOPAEDIC SURGERY

## 2024-01-25 PROCEDURE — 3050F LDL-C >= 130 MG/DL: CPT | Performed by: ORTHOPAEDIC SURGERY

## 2024-01-25 PROCEDURE — 72050 X-RAY EXAM NECK SPINE 4/5VWS: CPT

## 2024-01-25 PROCEDURE — 3008F BODY MASS INDEX DOCD: CPT | Performed by: ORTHOPAEDIC SURGERY

## 2024-01-25 PROCEDURE — 4010F ACE/ARB THERAPY RXD/TAKEN: CPT | Performed by: ORTHOPAEDIC SURGERY

## 2024-01-25 PROCEDURE — 1036F TOBACCO NON-USER: CPT | Performed by: ORTHOPAEDIC SURGERY

## 2024-01-25 NOTE — PROGRESS NOTES
Cora Acosta is a 63 y.o. female who presents for Pain and New Patient Visit of the Neck (Neck Pain, B/L Arm Pain radiating to elbows - X-Rays Today, MRI @  per Pain Mgmt - C6-7 cervical epidural steroid injection under fluoroscopic guidance w/ Dr. Lu Bautista, had prior inj that helped more - Starts PT 2/19/24 ).    HPI:  63-year-old female here for new patient and second opinion visit.  Neck pain with arm pain going to elbows.  She denies any fever chills nausea vomiting night sweats.  She has no bowel or bladder complaints.  She has seen Dr. Griffiths for right shoulder and periscapular pain.  That note from January 19, 2024 was reviewed.    Physical exam:  Well-nourished, well kept.   Patient can rise from a seated position, can sit from a standing position. Can get up heels and toes she balances on the table.  Patient is tender in the paraspinal musculature of the cervical spine is range of motion is mildly decreased secondary to some pain and stiffness no weakness no instability to muscle strength. examination of the upper extremities reveals no point tenderness, swelling, or deformity.  Range of motion of the shoulders, elbows, wrists, and fingers are full without crepitance, instability, or exacerbation of pain. Strength is 5/5 throughout. no redness, abrasions, or lesions on the upper extremities bilaterally.  Gross sensation intact to the extremities.  Deep tendon reflexes 2+ and symmetric bilaterally.  Humphreys negative.  Affect normal.  Alert and oriented X 3.  Coordination normal.    Imaging studies:  X-rays of the cervical spine were obtained and reviewed today.  An MRI from January 7, 2024 was reviewed.    Assessment:  63-year-old female here for evaluation of neck pain and shoulder and upper arm pain.  This is been going on for about 3 months.  She has had a history of neck and shoulder pain in the past but had an injection in the past that took care of all her problem.  She was doing very good  until putting up Gunnar tree in Thanksgiving of 2023 then she started having burning shoulder parascapular and neck pain.  Most of her complaint is her right shoulder and right upper arm.  Having a little bit of left-sided pain as well but her primary complaint is right parascapular and shoulder and upper arm pain.  He is not having any pain in the forearms or hands or wrist, she has had a history of bilateral carpal tunnel repair in the past.  She had a recent steroid injection in her neck that gave her absolutely no relief, she was given a course of steroids which helped tremendously but took her A1c from 7 to 11.  On x-rays she has a severely degenerative neck.  Her MRI shows multiple levels of degeneration and stenosis.  Her C3-4 level looks to be autofused or close to it, the remaining levels in her neck also look very degenerative.  She is starting physical therapy soon but has not done anything up to this visit.  On physical exam when I rotate and manipulate her shoulder I do get a positive Hanson and Neer on the right.  This may be a combination of neck and shoulder.  This is an undiagnosed problem with uncertain prognosis.    Plan:  For complete plan and/or surgical details, please refer to Dr. Felder's portion of this split dictation.    In a face-to-face encounter, I performed a history and physical examination, discussed pertinent diagnostic studies if indicated, and discussed diagnosis and management strategies with both the patient and the midlevel provider.  I reviewed the midlevel's note and agree with the documented findings and plan of care.    Right shoulder pain and neck pain.  I think the shoulder pain is actually coming from her shoulder as she has positive Neer, positive Hanson and positive empty can.  She has a stiff shoulder.  Her neck is severely degenerative with congenital narrowing and near autofusion of C3-4.  However, I do not see any stenosis on the MRI of her neck.  I do not  think this is a surgical problem for her neck.  Will get her into some physical therapy and into a shoulder surgeon and she can follow-up with me on a as needed basis.

## 2024-01-30 ENCOUNTER — APPOINTMENT (OUTPATIENT)
Dept: PHARMACY | Facility: HOSPITAL | Age: 64
End: 2024-01-30
Payer: COMMERCIAL

## 2024-02-05 ENCOUNTER — TELEMEDICINE (OUTPATIENT)
Dept: PHARMACY | Facility: HOSPITAL | Age: 64
End: 2024-02-05
Payer: COMMERCIAL

## 2024-02-05 DIAGNOSIS — E11.69 TYPE 2 DIABETES MELLITUS WITH OTHER SPECIFIED COMPLICATION, WITHOUT LONG-TERM CURRENT USE OF INSULIN (MULTI): ICD-10-CM

## 2024-02-05 RX ORDER — DULAGLUTIDE 4.5 MG/.5ML
4.5 INJECTION, SOLUTION SUBCUTANEOUS
Qty: 2 ML | Refills: 2 | Status: SHIPPED | OUTPATIENT
Start: 2024-02-05 | End: 2024-03-15 | Stop reason: SDUPTHER

## 2024-02-05 NOTE — PROGRESS NOTES
Subjective     Patient ID: Cora Acosta is a 63 y.o. female who presents for Diabetes.    Referring Provider: Gayathri Davidson DO     Diabetes  She presents for her initial diabetic visit. She has type 2 diabetes mellitus. Her disease course has been worsening.     Diet: Reports doing well -     Exercise: Currently limited- has joined a gym- plans to start water exercises       Allergies   Allergen Reactions    Erythromycin GI Upset    Animal Dander Unknown    Codeine Nausea Only    Meperidine Nausea Only    Mold Unknown    Pollen Extracts Unknown       Objective     Current DM Pharmacotherapy:   Trulicity 3 mg/0.5 mL - once weekly   Metformin  mg - 1 tablet daily     SECONDARY PREVENTION  - Statin? Yes  - ACE-I/ARB? Yes  - Aspirin? No    Current monitoring regimen:   Patient is using: glucometer    Testing frequency: Never    SMBG Readings: Not available     Any episodes of hypoglycemia? No  Hypoglycemia awareness? Yes      Pertinent PMH Review:  - PMH of Pancreatitis: No  - PMH/FH of Medullary Thyroid Cancer: No  - PMH of Retinopathy: No  - PMH of Urinary Tract Infections: No    Lab Review  Lab Results   Component Value Date    BILITOT 0.5 01/19/2024    CALCIUM 9.1 01/19/2024    CO2 25 01/19/2024     01/19/2024    CREATININE 0.75 01/19/2024    GLUCOSE 176 (H) 01/19/2024    ALKPHOS 85 01/19/2024    K 4.1 01/19/2024    PROT 7.0 01/19/2024     01/19/2024    AST 24 01/19/2024    ALT 29 01/19/2024    BUN 14 01/19/2024    ANIONGAP 15 01/19/2024    ALBUMIN 4.1 01/19/2024     Lab Results   Component Value Date    CHOL 175 12/15/2020    HDL 51.7 12/15/2020     Lab Results   Component Value Date    HGBA1C 11.9 (A) 01/19/2024     The ASCVD Risk score (Sven WESLEY, et al., 2019) failed to calculate for the following reasons:    Cannot find a previous HDL lab    Cannot find a previous total cholesterol lab      Assessment/Plan     Problem List Items Addressed This Visit       Diabetes mellitus, type 2  (CMS/[facility])     Increase to Trulicity 4.5 mg/0.5 mL - once weekly (after finishing remaining 2 doses of Trulicity 3 mg)     Continue   Metformin  mg - 1 tablet daily    Plan to switch to SGLT-2i pending insurance coverage             Type 2 diabetes mellitus, is not at goal. Goal A1C: <7%    Follow up: I recommend diabetes care be 4 weeks.    Olesya StacyD [facility]  Clinical Pharmacy Specialist, Primary Care     Continue all meds under the continuation of care with the referring provider and clinical pharmacy team

## 2024-02-05 NOTE — ASSESSMENT & PLAN NOTE
Increase to Trulicity 4.5 mg/0.5 mL - once weekly (after finishing remaining 2 doses of Trulicity 3 mg)     Continue   Metformin  mg - 1 tablet daily    Plan to switch to SGLT-2i pending insurance coverage

## 2024-02-07 ENCOUNTER — APPOINTMENT (OUTPATIENT)
Dept: PRIMARY CARE | Facility: CLINIC | Age: 64
End: 2024-02-07
Payer: COMMERCIAL

## 2024-02-15 ENCOUNTER — TELEPHONE (OUTPATIENT)
Dept: PRIMARY CARE | Facility: CLINIC | Age: 64
End: 2024-02-15

## 2024-02-15 DIAGNOSIS — M25.511 CHRONIC RIGHT SHOULDER PAIN: Primary | ICD-10-CM

## 2024-02-15 DIAGNOSIS — G89.29 CHRONIC RIGHT SHOULDER PAIN: Primary | ICD-10-CM

## 2024-02-15 DIAGNOSIS — E11.9 TYPE 2 DIABETES MELLITUS WITHOUT COMPLICATION, WITHOUT LONG-TERM CURRENT USE OF INSULIN (MULTI): ICD-10-CM

## 2024-02-15 NOTE — TELEPHONE ENCOUNTER
Patient called in concerning a referral to Oscar Almeida [birth year] Malina Dominguez Dentist marilynn letter is needed  due to age they would like to get a marilynn for his health before these teeth removalsdue to frozen shoulder. Carlie I spoke to the patient she said that she is already seen Ortho and has already did Physical Therapy she needs a referral to nguyen   she is taking this serious what is going on with her

## 2024-02-19 ENCOUNTER — APPOINTMENT (OUTPATIENT)
Dept: PHYSICAL THERAPY | Facility: CLINIC | Age: 64
End: 2024-02-19
Payer: COMMERCIAL

## 2024-02-20 ENCOUNTER — TELEPHONE (OUTPATIENT)
Dept: PRIMARY CARE | Facility: CLINIC | Age: 64
End: 2024-02-20
Payer: COMMERCIAL

## 2024-02-20 DIAGNOSIS — E03.9 HYPOTHYROIDISM, UNSPECIFIED TYPE: Primary | ICD-10-CM

## 2024-02-20 DIAGNOSIS — E06.3 HYPOTHYROIDISM DUE TO HASHIMOTO'S THYROIDITIS: ICD-10-CM

## 2024-02-20 DIAGNOSIS — E03.8 HYPOTHYROIDISM DUE TO HASHIMOTO'S THYROIDITIS: ICD-10-CM

## 2024-02-20 DIAGNOSIS — E11.9 TYPE 2 DIABETES MELLITUS WITHOUT COMPLICATION, WITHOUT LONG-TERM CURRENT USE OF INSULIN (MULTI): ICD-10-CM

## 2024-02-20 RX ORDER — LEVOTHYROXINE SODIUM 125 UG/1
TABLET ORAL
Qty: 120 TABLET | Refills: 0 | Status: SHIPPED | OUTPATIENT
Start: 2024-02-20

## 2024-02-26 ENCOUNTER — APPOINTMENT (OUTPATIENT)
Dept: PHYSICAL THERAPY | Facility: CLINIC | Age: 64
End: 2024-02-26
Payer: COMMERCIAL

## 2024-03-04 ENCOUNTER — APPOINTMENT (OUTPATIENT)
Dept: PHARMACY | Facility: HOSPITAL | Age: 64
End: 2024-03-04
Payer: COMMERCIAL

## 2024-03-08 ENCOUNTER — APPOINTMENT (OUTPATIENT)
Dept: PRIMARY CARE | Facility: CLINIC | Age: 64
End: 2024-03-08
Payer: COMMERCIAL

## 2024-03-15 ENCOUNTER — OFFICE VISIT (OUTPATIENT)
Dept: PRIMARY CARE | Facility: CLINIC | Age: 64
End: 2024-03-15
Payer: COMMERCIAL

## 2024-03-15 VITALS
RESPIRATION RATE: 12 BRPM | HEART RATE: 86 BPM | BODY MASS INDEX: 42.47 KG/M2 | OXYGEN SATURATION: 99 % | SYSTOLIC BLOOD PRESSURE: 130 MMHG | TEMPERATURE: 97 F | DIASTOLIC BLOOD PRESSURE: 70 MMHG | WEIGHT: 188.8 LBS | HEIGHT: 56 IN

## 2024-03-15 DIAGNOSIS — F43.10 PTSD (POST-TRAUMATIC STRESS DISORDER): ICD-10-CM

## 2024-03-15 DIAGNOSIS — F41.9 ANXIETY: ICD-10-CM

## 2024-03-15 DIAGNOSIS — G89.4 CHRONIC PAIN SYNDROME: ICD-10-CM

## 2024-03-15 DIAGNOSIS — F33.2 SEVERE EPISODE OF RECURRENT MAJOR DEPRESSIVE DISORDER, WITHOUT PSYCHOTIC FEATURES (MULTI): ICD-10-CM

## 2024-03-15 DIAGNOSIS — E11.618 TYPE 2 DIABETES MELLITUS WITH OTHER DIABETIC ARTHROPATHY, WITHOUT LONG-TERM CURRENT USE OF INSULIN (MULTI): Primary | ICD-10-CM

## 2024-03-15 DIAGNOSIS — E03.8 HYPOTHYROIDISM DUE TO HASHIMOTO'S THYROIDITIS: ICD-10-CM

## 2024-03-15 DIAGNOSIS — E11.69 TYPE 2 DIABETES MELLITUS WITH OTHER SPECIFIED COMPLICATION, WITHOUT LONG-TERM CURRENT USE OF INSULIN (MULTI): ICD-10-CM

## 2024-03-15 DIAGNOSIS — E06.3 HYPOTHYROIDISM DUE TO HASHIMOTO'S THYROIDITIS: ICD-10-CM

## 2024-03-15 PROCEDURE — 4010F ACE/ARB THERAPY RXD/TAKEN: CPT | Performed by: NURSE PRACTITIONER

## 2024-03-15 PROCEDURE — 99215 OFFICE O/P EST HI 40 MIN: CPT | Performed by: NURSE PRACTITIONER

## 2024-03-15 PROCEDURE — 3075F SYST BP GE 130 - 139MM HG: CPT | Performed by: NURSE PRACTITIONER

## 2024-03-15 PROCEDURE — 3050F LDL-C >= 130 MG/DL: CPT | Performed by: NURSE PRACTITIONER

## 2024-03-15 PROCEDURE — 3078F DIAST BP <80 MM HG: CPT | Performed by: NURSE PRACTITIONER

## 2024-03-15 RX ORDER — DULAGLUTIDE 4.5 MG/.5ML
4.5 INJECTION, SOLUTION SUBCUTANEOUS
Qty: 2 ML | Refills: 2 | Status: SHIPPED | OUTPATIENT
Start: 2024-03-15

## 2024-03-15 RX ORDER — DULOXETIN HYDROCHLORIDE 60 MG/1
60 CAPSULE, DELAYED RELEASE ORAL DAILY
Qty: 90 CAPSULE | Refills: 0 | Status: SHIPPED | OUTPATIENT
Start: 2024-03-15 | End: 2024-05-22 | Stop reason: WASHOUT

## 2024-03-15 ASSESSMENT — PATIENT HEALTH QUESTIONNAIRE - PHQ9
1. LITTLE INTEREST OR PLEASURE IN DOING THINGS: NOT AT ALL
2. FEELING DOWN, DEPRESSED OR HOPELESS: NOT AT ALL
SUM OF ALL RESPONSES TO PHQ9 QUESTIONS 1 AND 2: 0

## 2024-03-15 NOTE — PATIENT INSTRUCTIONS
Focus on just a few things now so you can get a foundation of strength and stability   When you are ready you can  about the relationships ,the person has a disability image  issue perhaps   Talk to the ortho swapna about the shoulder MRI   Follow up in 2  months   The comprehensive pain management can give you ketamine influsions for chronic pain  L;et me know if you want that   I will write you what you need

## 2024-03-15 NOTE — PROGRESS NOTES
"Subjective   Patient ID: Cora Acosta is a 63 y.o. female who presents for Follow-up (Follow up- DM).    HPI   The patient has had a frozen right  shoulder but both shoulders hurt   She had a time before thanksgiving that things were bad at work and did not take any meds. She was told her frozen shoulder was due to her diabetes   She is better now   She was given a shot with pain medication  She took prednisone for her arms in December   Her last glucose  was high and aic level was 11   She is taking duloxetine for pain and depression   She has pain everywhere  She takes 3 naprosyn in the am and two at night   She had 3 stages of emotions   She was off her meds 2 to 3 months due to extreme stress   Her  is helping her more since she has a frozen shoulders , he drinks alcohol , his mother  recently   She had done shots in her knees and had dottie   She had her armsvover head for a few hours doing the Markr and that could have irritated the neck   She works in the lab at the [employer]   She needs a disability form filled so she has limitations at her job   She cannot lift heavy items , there are gallons of liquid that are difficult    she needs the restriction of  no picking up more than 9 lbs   She can lift the  partially  at the end of the day  Heat helps some   She is a lead histotechnologist   She joined a  [facility] gym they have a pool , it is on her way home  She had an mri of both shoulders 3/11/2024   I spent 35 minutes with the patient and 10 minutes writing the chart   Review of Systems  Negative except what was mentioned in the HPI       Objective   /70 (Patient Position: Sitting)   Pulse 86   Temp 36.1 °C (97 °F)   Resp 12   Ht 1.422 m (4' 8\")   Wt 85.6 kg (188 lb 12.8 oz)   SpO2 99%   BMI 42.33 kg/m²     Physical Exam  Vitals and nursing note reviewed.   Constitutional:       Appearance: Normal appearance.   HENT:      Head: Normocephalic and atraumatic.      Right Ear: " Tympanic membrane normal.      Left Ear: Tympanic membrane normal.      Nose: Nose normal.      Mouth/Throat:      Mouth: Mucous membranes are moist.   Eyes:      Extraocular Movements: Extraocular movements intact.      Conjunctiva/sclera: Conjunctivae normal.   Cardiovascular:      Rate and Rhythm: Normal rate and regular rhythm.      Pulses: Normal pulses.      Heart sounds: Normal heart sounds.   Pulmonary:      Effort: Pulmonary effort is normal.      Breath sounds: Normal breath sounds.   Abdominal:      General: Abdomen is flat. Bowel sounds are normal.      Palpations: Abdomen is soft.   Musculoskeletal:         General: Normal range of motion.      Cervical back: Normal range of motion and neck supple.      Comments: Unable to lift the right arm above 90 degrees   Tenderness over both shoulders and over the posterior cervical muscles    Skin:     General: Skin is warm and dry.   Neurological:      General: No focal deficit present.      Mental Status: She is alert and oriented to person, place, and time. Mental status is at baseline.   Psychiatric:         Behavior: Behavior normal.         Thought Content: Thought content normal.         Judgment: Judgment normal.      Comments: Appears to be emotionally numb          Assessment/Plan   Problem List Items Addressed This Visit             ICD-10-CM    Diabetes mellitus, type 2 (CMS/HCC) - Primary E11.9    Relevant Medications    dulaglutide (Trulicity) 4.5 mg/0.5 mL pen injector    Other Relevant Orders    Comprehensive Metabolic Panel    Hemoglobin A1C    Cholesterol, LDL Direct    Albumin, urine, random    Hypothyroidism E03.9    Relevant Orders    Tsh With Reflex To Free T4 If Abnormal    Anxiety F41.9    Severe episode of recurrent major depressive disorder, without psychotic features (CMS/HCC) F33.2    PTSD (post-traumatic stress disorder) F43.10     Other Visit Diagnoses         Codes    Chronic pain syndrome     G89.4    Relevant Medications     DULoxetine (Cymbalta) 60 mg DR capsule

## 2024-03-26 ENCOUNTER — APPOINTMENT (OUTPATIENT)
Dept: RADIOLOGY | Facility: CLINIC | Age: 64
End: 2024-03-26
Payer: COMMERCIAL

## 2024-03-26 ENCOUNTER — TELEMEDICINE (OUTPATIENT)
Dept: PHARMACY | Facility: HOSPITAL | Age: 64
End: 2024-03-26
Payer: COMMERCIAL

## 2024-03-26 DIAGNOSIS — E11.69 TYPE 2 DIABETES MELLITUS WITH OTHER SPECIFIED COMPLICATION, WITHOUT LONG-TERM CURRENT USE OF INSULIN (MULTI): ICD-10-CM

## 2024-03-26 RX ORDER — DAPAGLIFLOZIN 10 MG/1
10 TABLET, FILM COATED ORAL DAILY
Qty: 90 TABLET | Refills: 0 | Status: SHIPPED | OUTPATIENT
Start: 2024-03-26

## 2024-03-26 NOTE — ASSESSMENT & PLAN NOTE
Start Farxiga 10 mg once daily   STOP Metformin 500 mg once daily   Continue   Trulicity 4.5 mg/0.5 mL - once weekly

## 2024-03-26 NOTE — PROGRESS NOTES
Subjective     Patient ID: Cora Acosta is a 63 y.o. female who presents for Diabetes.    Referring Provider: Gayathri Davidson,      Diabetes  She presents for her initial diabetic visit. She has type 2 diabetes mellitus. Her disease course has been worsening.     Diet: Reports not doing as well lately     Exercise: Currently limited- has joined a gym- plans to start water exercises     Some concern for her thryoid function- states it worsened from sleeping next to her cat undergoing I 131 therapy for hyperthyroidism. Will be going to see endocrinology.       Allergies   Allergen Reactions    Erythromycin GI Upset    Animal Dander Unknown    Codeine Nausea Only    Meperidine Nausea Only    Mold Unknown    Pollen Extracts Unknown       Objective     Current DM Pharmacotherapy:   Trulicity 4.5 mg/0.5 mL - once weekly   Metformin  mg - 1 tablet daily     SECONDARY PREVENTION  - Statin? Yes  - ACE-I/ARB? Yes  - Aspirin? No    Current monitoring regimen:   Patient is using: glucometer    Testing frequency: Never    SMBG Readings: Not available     Any episodes of hypoglycemia? No  Hypoglycemia awareness? Yes      Pertinent PMH Review:  - PMH of Pancreatitis: No  - PMH/FH of Medullary Thyroid Cancer: No  - PMH of Retinopathy: No  - PMH of Urinary Tract Infections: No    Lab Review  Lab Results   Component Value Date    BILITOT 0.5 01/19/2024    CALCIUM 9.1 01/19/2024    CO2 25 01/19/2024     01/19/2024    CREATININE 0.75 01/19/2024    GLUCOSE 176 (H) 01/19/2024    ALKPHOS 85 01/19/2024    K 4.1 01/19/2024    PROT 7.0 01/19/2024     01/19/2024    AST 24 01/19/2024    ALT 29 01/19/2024    BUN 14 01/19/2024    ANIONGAP 15 01/19/2024    ALBUMIN 4.1 01/19/2024     Lab Results   Component Value Date    CHOL 175 12/15/2020    HDL 51.7 12/15/2020     Lab Results   Component Value Date    HGBA1C 11.9 (A) 01/19/2024     The ASCVD Risk score (Sven WESLEY, et al., 2019) failed to calculate for the following  reasons:    Cannot find a previous HDL lab    Cannot find a previous total cholesterol lab      Assessment/Plan     Problem List Items Addressed This Visit       Diabetes mellitus, type 2 (CMS/[facility])     Start Farxiga 10 mg once daily   STOP Metformin 500 mg once daily   Continue   Trulicity 4.5 mg/0.5 mL - once weekly             Type 2 diabetes mellitus, is not at goal. Goal A1C: <7%    Follow up: I recommend diabetes care be 4-6 weeks.  Patient to get updated labs on 4/19    Olesya StacyD [facility]  Clinical Pharmacy Specialist, Primary Care     Continue all meds under the continuation of care with the referring provider and clinical pharmacy team

## 2024-03-27 ENCOUNTER — HOSPITAL ENCOUNTER (OUTPATIENT)
Dept: RADIOLOGY | Facility: CLINIC | Age: 64
Discharge: HOME | End: 2024-03-27
Payer: COMMERCIAL

## 2024-03-27 ENCOUNTER — APPOINTMENT (OUTPATIENT)
Dept: RADIOLOGY | Facility: CLINIC | Age: 64
End: 2024-03-27
Payer: COMMERCIAL

## 2024-03-27 DIAGNOSIS — M25.612 STIFFNESS OF LEFT SHOULDER, NOT ELSEWHERE CLASSIFIED: ICD-10-CM

## 2024-03-27 DIAGNOSIS — M25.511 PAIN IN RIGHT SHOULDER: ICD-10-CM

## 2024-03-27 PROCEDURE — 73221 MRI JOINT UPR EXTREM W/O DYE: CPT | Mod: RIGHT SIDE | Performed by: RADIOLOGY

## 2024-03-27 PROCEDURE — 73221 MRI JOINT UPR EXTREM W/O DYE: CPT | Mod: LEFT SIDE | Performed by: RADIOLOGY

## 2024-03-27 PROCEDURE — 73221 MRI JOINT UPR EXTREM W/O DYE: CPT | Mod: RT

## 2024-03-27 PROCEDURE — 73221 MRI JOINT UPR EXTREM W/O DYE: CPT | Mod: LT

## 2024-04-19 ENCOUNTER — OFFICE VISIT (OUTPATIENT)
Dept: ORTHOPEDIC SURGERY | Facility: CLINIC | Age: 64
End: 2024-04-19
Payer: COMMERCIAL

## 2024-04-19 ENCOUNTER — APPOINTMENT (OUTPATIENT)
Dept: RADIOLOGY | Facility: CLINIC | Age: 64
End: 2024-04-19
Payer: COMMERCIAL

## 2024-04-19 VITALS — BODY MASS INDEX: 41.61 KG/M2 | WEIGHT: 185 LBS | HEIGHT: 56 IN

## 2024-04-19 DIAGNOSIS — M75.01 ADHESIVE CAPSULITIS OF BOTH SHOULDERS: ICD-10-CM

## 2024-04-19 DIAGNOSIS — M75.02 ADHESIVE CAPSULITIS OF LEFT SHOULDER: Primary | ICD-10-CM

## 2024-04-19 DIAGNOSIS — M75.02 ADHESIVE CAPSULITIS OF BOTH SHOULDERS: ICD-10-CM

## 2024-04-19 DIAGNOSIS — M75.01 ADHESIVE CAPSULITIS OF RIGHT SHOULDER: ICD-10-CM

## 2024-04-19 DIAGNOSIS — M25.511 RIGHT SHOULDER PAIN, UNSPECIFIED CHRONICITY: ICD-10-CM

## 2024-04-19 PROCEDURE — 99214 OFFICE O/P EST MOD 30 MIN: CPT | Performed by: ORTHOPAEDIC SURGERY

## 2024-04-19 PROCEDURE — 4010F ACE/ARB THERAPY RXD/TAKEN: CPT | Performed by: ORTHOPAEDIC SURGERY

## 2024-04-19 PROCEDURE — 1036F TOBACCO NON-USER: CPT | Performed by: ORTHOPAEDIC SURGERY

## 2024-04-19 PROCEDURE — 3050F LDL-C >= 130 MG/DL: CPT | Performed by: ORTHOPAEDIC SURGERY

## 2024-04-19 ASSESSMENT — PAIN SCALES - GENERAL: PAINLEVEL_OUTOF10: 2

## 2024-04-19 ASSESSMENT — PAIN - FUNCTIONAL ASSESSMENT: PAIN_FUNCTIONAL_ASSESSMENT: 0-10

## 2024-04-19 NOTE — PROGRESS NOTES
63-year-old female who is diabetic with a BMI of 42 presents with right worse than left shoulder problems.  Patient stated the shoulder problem started November and Thanksgiving weekend when she was putting up a [place] tree.  Since then she has had pain and stiffness in the bilateral shoulders.  She has been seeing Dr. Hensley who gave her a steroid injection in the right shoulder 1 month ago that did significantly help the pain but not the range of motion problems.  He ordered MRIs and is recommending a right shoulder arthroscopy with arthroscopic lysis of adhesions and manipulation.  She comes to see me today for second opinion    Patients' self reported past medical history, medications, allergies, surgical history, family and social history as well as a 10 point review of systems has been documented in the new patient intake form and scanned into the patient's electronic medical record.  The intake form was reviewed by Dr Kimble during the office visit and signed by Dr. Kimble and the patient.  Pertinent findings are documented in the HPI.    General Multi-System Physical Exam:  Constitutional  General appearance:  Alert, oriented, and in no acute distress.  Well developed, well nourished.  Head and Face  Head and face:  Normocephalic and atraumatic.  Ears, Nose, Mouth, and Throat  External inspection of ears and nose: Normal.  Eyes:  Pupils are equal and round.  Neck  Neck:  no neck mass was observed.  Pulmonary  Respiratory effort:  no respiratory distress.  Cardiovascular  Intact distal pulses.  Lymphatic  Palpation of lymph nodes in the affected extremity:  Normal.  Skin  Skin and subcutaneous tissue:  Normal skin color and pigmentation.  Normal skin turgor.  No rashes.  Neurologic  Sensation:  normal to light touch.  Psychiatric  Judgement and insight:  Intact.  Mood and affect:  Normal.  Musculoskeletal  Right shoulder has 90 degrees of abduction forward flexion 30 degrees of external rotation internal  rotates to sacrum.  Left shoulder is 90 degrees of abduction forward flexion 15 degrees of external rotation internal rotates to greater trochanter.  Grossly neurovascular tact bilateral upper extremities    The patient refused x-rays today    Dr Kimble independently interpreted the patient's MRI (performed by the Radiology department) by viewing the MRI images and this is Dr. Kimble's personal interpretation:     MRI of the bilateral shoulders show partial rotator cuff tearing    I had a long discussion with the patient regards to her shoulders.  We discussed the fact that she does have frozen shoulder in both of her shoulders which is consistent with her diabetes mellitus.  I explained the patient that I have generally seen better results with conservative treatment with frozen shoulders when patients get steroid injections in the shoulders and aggressive physical therapy for manipulation of the shoulders.  Patient states that she has a lot of pain in her shoulders and she has a  she needs to take care of so she needs to get back to taking care of him as soon as possible and time is of the essence.  She states the doctor Shellie had recommended a right shoulder scope with lysis of adhesions and manipulation.  I explained to her that I have seen better results with conservative treatment then with surgical treatment however Dr. Hensley is a great shoulder surgeon.  It seems that this patient would like to proceed with surgery for this problem so we will refer her back to Dr. Hensley for the surgery he discussed with her.  The patient does have numerous questions in regards to the surgery which she should address with Dr. Hensley.    I did give the patient a prescription for physical therapy for aggressive manual manipulation of the bilateral shoulders but it sounds like the patient would like to proceed with surgery Dr. Hensley.        This patient has had longstanding pain and weakness in their affected  extremity which has gotten worse over the last few months.  Non-operative treatment has failed to help this patient and the pain is worsening.  That would classify this problem as a chronic illness with exacerbation and progression.    Due to this patient's condition, they are at a moderate risk of morbidity from additional diagnostic testing / treatment.                  If the patient does have surgery with Dr. Hensley, there would be nothing further I would be able to offer this patient.

## 2024-04-30 ENCOUNTER — APPOINTMENT (OUTPATIENT)
Dept: PHARMACY | Facility: HOSPITAL | Age: 64
End: 2024-04-30
Payer: COMMERCIAL

## 2024-05-17 ENCOUNTER — APPOINTMENT (OUTPATIENT)
Dept: PRIMARY CARE | Facility: CLINIC | Age: 64
End: 2024-05-17
Payer: COMMERCIAL

## 2024-05-22 ENCOUNTER — TELEPHONE (OUTPATIENT)
Dept: PRIMARY CARE | Facility: CLINIC | Age: 64
End: 2024-05-22

## 2024-05-22 DIAGNOSIS — F33.2 SEVERE EPISODE OF RECURRENT MAJOR DEPRESSIVE DISORDER, WITHOUT PSYCHOTIC FEATURES (MULTI): Primary | ICD-10-CM

## 2024-05-22 RX ORDER — ESCITALOPRAM OXALATE 10 MG/1
TABLET ORAL
Qty: 60 TABLET | Refills: 2 | Status: SHIPPED | OUTPATIENT
Start: 2024-05-22

## 2024-05-30 ENCOUNTER — LAB (OUTPATIENT)
Dept: LAB | Facility: LAB | Age: 64
End: 2024-05-30
Payer: COMMERCIAL

## 2024-05-30 DIAGNOSIS — Z01.818 ENCOUNTER FOR OTHER PREPROCEDURAL EXAMINATION: Primary | ICD-10-CM

## 2024-05-30 LAB
ALBUMIN SERPL BCP-MCNC: 3.8 G/DL (ref 3.4–5)
ALP SERPL-CCNC: 124 U/L (ref 33–136)
ALT SERPL W P-5'-P-CCNC: 27 U/L (ref 7–45)
ANION GAP SERPL CALC-SCNC: 13 MMOL/L (ref 10–20)
AST SERPL W P-5'-P-CCNC: 20 U/L (ref 9–39)
BASOPHILS # BLD AUTO: 0.05 X10*3/UL (ref 0–0.1)
BASOPHILS NFR BLD AUTO: 0.6 %
BILIRUB SERPL-MCNC: 0.3 MG/DL (ref 0–1.2)
BUN SERPL-MCNC: 18 MG/DL (ref 6–23)
CALCIUM SERPL-MCNC: 8.7 MG/DL (ref 8.6–10.6)
CHLORIDE SERPL-SCNC: 100 MMOL/L (ref 98–107)
CO2 SERPL-SCNC: 27 MMOL/L (ref 21–32)
CREAT SERPL-MCNC: 0.69 MG/DL (ref 0.5–1.05)
EGFRCR SERPLBLD CKD-EPI 2021: >90 ML/MIN/1.73M*2
EOSINOPHIL # BLD AUTO: 0.4 X10*3/UL (ref 0–0.7)
EOSINOPHIL NFR BLD AUTO: 5 %
ERYTHROCYTE [DISTWIDTH] IN BLOOD BY AUTOMATED COUNT: 16 % (ref 11.5–14.5)
GLUCOSE SERPL-MCNC: 161 MG/DL (ref 74–99)
HCT VFR BLD AUTO: 35.1 % (ref 36–46)
HGB BLD-MCNC: 11.4 G/DL (ref 12–16)
IMM GRANULOCYTES # BLD AUTO: 0.02 X10*3/UL (ref 0–0.7)
IMM GRANULOCYTES NFR BLD AUTO: 0.2 % (ref 0–0.9)
LYMPHOCYTES # BLD AUTO: 2.65 X10*3/UL (ref 1.2–4.8)
LYMPHOCYTES NFR BLD AUTO: 33 %
MCH RBC QN AUTO: 27.2 PG (ref 26–34)
MCHC RBC AUTO-ENTMCNC: 32.5 G/DL (ref 32–36)
MCV RBC AUTO: 84 FL (ref 80–100)
MONOCYTES # BLD AUTO: 0.49 X10*3/UL (ref 0.1–1)
MONOCYTES NFR BLD AUTO: 6.1 %
NEUTROPHILS # BLD AUTO: 4.41 X10*3/UL (ref 1.2–7.7)
NEUTROPHILS NFR BLD AUTO: 55.1 %
NRBC BLD-RTO: 0 /100 WBCS (ref 0–0)
PLATELET # BLD AUTO: 348 X10*3/UL (ref 150–450)
POTASSIUM SERPL-SCNC: 4.2 MMOL/L (ref 3.5–5.3)
PROT SERPL-MCNC: 7 G/DL (ref 6.4–8.2)
RBC # BLD AUTO: 4.19 X10*6/UL (ref 4–5.2)
SODIUM SERPL-SCNC: 136 MMOL/L (ref 136–145)
WBC # BLD AUTO: 8 X10*3/UL (ref 4.4–11.3)

## 2024-05-30 PROCEDURE — 36415 COLL VENOUS BLD VENIPUNCTURE: CPT

## 2024-05-30 PROCEDURE — 85025 COMPLETE CBC W/AUTO DIFF WBC: CPT

## 2024-05-30 PROCEDURE — 80053 COMPREHEN METABOLIC PANEL: CPT

## 2024-06-06 ENCOUNTER — APPOINTMENT (OUTPATIENT)
Dept: ENDOCRINOLOGY | Facility: CLINIC | Age: 64
End: 2024-06-06
Payer: COMMERCIAL

## 2024-06-26 ENCOUNTER — TELEPHONE (OUTPATIENT)
Dept: PRIMARY CARE | Facility: CLINIC | Age: 64
End: 2024-06-26

## 2024-06-26 NOTE — TELEPHONE ENCOUNTER
Patient of Loulou Rosa,  Was seen by you 01/19/24  States she was put on Ozempic 2.0 mg by her Endocrinologist due to the shortage of Trulicity.  Feels nauseous, asks for med for that  Please advise  TY

## 2024-06-26 NOTE — TELEPHONE ENCOUNTER
She is past due for labs- pls have her give blood  Will message [facility] pharm team ( Olesya) to help with medication switch    Olesya- can you help with this?    Thanks!

## 2024-07-05 ENCOUNTER — LAB (OUTPATIENT)
Dept: LAB | Facility: LAB | Age: 64
End: 2024-07-05
Payer: COMMERCIAL

## 2024-07-08 ENCOUNTER — APPOINTMENT (OUTPATIENT)
Dept: PHARMACY | Facility: HOSPITAL | Age: 64
End: 2024-07-08
Payer: COMMERCIAL

## 2024-07-08 DIAGNOSIS — E11.69 TYPE 2 DIABETES MELLITUS WITH OTHER SPECIFIED COMPLICATION, WITHOUT LONG-TERM CURRENT USE OF INSULIN (MULTI): ICD-10-CM

## 2024-07-08 PROCEDURE — RXMED WILLOW AMBULATORY MEDICATION CHARGE

## 2024-07-08 RX ORDER — DULAGLUTIDE 1.5 MG/.5ML
1.5 INJECTION, SOLUTION SUBCUTANEOUS
Qty: 2 ML | Refills: 11 | Status: SHIPPED | OUTPATIENT
Start: 2024-07-14

## 2024-07-08 NOTE — PROGRESS NOTES
Subjective     Patient ID: Cora Acosta is a 63 y.o. female who presents for Diabetes.    Referring Provider: Loulou Rosa APRN*     Diabetes  She presents for her follow-up diabetic visit. She has type 2 diabetes mellitus. Her disease course has been worsening.     Initially saw Endocrinologist with [facility] for thyroid concerns. Then planned to transition DM care there. When Trulicity 4.5 mg was on backorder Ozempic 2 mg was ordered by [facility] Endo. Patient took and was very ill including vomitting. Would like to now return DM care to PCP/Clinical PharmD with . Last GLP-1 dose was 2 weeks ago.     Diet: Not covered today     Exercise: Not covered today       Allergies   Allergen Reactions    Erythromycin GI Upset    Animal Dander Unknown    Codeine Nausea Only    Meperidine Nausea Only    Mold Unknown    Pollen Extracts Unknown       Objective     Current DM Pharmacotherapy:   Metformin  mg - 1 tablet daily     SECONDARY PREVENTION  - Statin? Yes  - ACE-I/ARB? Yes  - Aspirin? No    Current monitoring regimen:   Patient is using: glucometer    Testing frequency: Ocassionally    SMBG Readings: Reports random readings within range at home    Any episodes of hypoglycemia? No  Hypoglycemia awareness? Yes      Pertinent PMH Review:  - PMH of Pancreatitis: No  - PMH/FH of Medullary Thyroid Cancer: No  - PMH of Retinopathy: No  - PMH of Urinary Tract Infections: No    Lab Review  Lab Results   Component Value Date    BILITOT 0.4 07/25/2024    CALCIUM 9.4 07/25/2024    CO2 27 07/25/2024     07/25/2024    CREATININE 0.71 07/25/2024    GLUCOSE 103 (H) 07/25/2024    ALKPHOS 99 07/25/2024    K 4.1 07/25/2024    PROT 7.3 07/25/2024     07/25/2024    AST 20 07/25/2024    ALT 21 07/25/2024    BUN 20 07/25/2024    ANIONGAP 13 07/25/2024    ALBUMIN 4.2 07/25/2024     Lab Results   Component Value Date    CHOL 175 12/15/2020    HDL 51.7 12/15/2020     Lab Results   Component Value Date    HGBA1C 7.2 (H) 07/25/2024      The ASCVD Risk score (Sven WESLEY, et al., 2019) failed to calculate for the following reasons:    Cannot find a previous HDL lab    Cannot find a previous total cholesterol lab      Assessment/Plan     Problem List Items Addressed This Visit       Diabetes mellitus, type 2 (Multi)    Relevant Orders    Follow Up In [facility] - Pharmacy       Type 2 diabetes mellitus, is not at goal. Goal A1C: <7%    Follow up: I recommend diabetes care be 4 weeks.  Patient to get updated A1C at PCP visit and will bring lab results from 4/19 visit with HARDY Gomez PharmD [facility]  Clinical Pharmacy Specialist, Primary Care     Continue all meds under the continuation of care with the referring provider and clinical pharmacy team

## 2024-07-10 ENCOUNTER — PHARMACY VISIT (OUTPATIENT)
Dept: PHARMACY | Facility: CLINIC | Age: 64
End: 2024-07-10
Payer: MEDICARE

## 2024-07-17 DIAGNOSIS — E11.618 TYPE 2 DIABETES MELLITUS WITH OTHER DIABETIC ARTHROPATHY, WITHOUT LONG-TERM CURRENT USE OF INSULIN (MULTI): Primary | ICD-10-CM

## 2024-07-25 ENCOUNTER — LAB (OUTPATIENT)
Dept: LAB | Facility: LAB | Age: 64
End: 2024-07-25
Payer: COMMERCIAL

## 2024-07-25 DIAGNOSIS — E11.618 TYPE 2 DIABETES MELLITUS WITH OTHER DIABETIC ARTHROPATHY, WITHOUT LONG-TERM CURRENT USE OF INSULIN (MULTI): ICD-10-CM

## 2024-07-25 PROCEDURE — 82570 ASSAY OF URINE CREATININE: CPT

## 2024-07-25 PROCEDURE — 83036 HEMOGLOBIN GLYCOSYLATED A1C: CPT

## 2024-07-25 PROCEDURE — 82043 UR ALBUMIN QUANTITATIVE: CPT

## 2024-07-25 PROCEDURE — 80053 COMPREHEN METABOLIC PANEL: CPT

## 2024-07-25 PROCEDURE — 36415 COLL VENOUS BLD VENIPUNCTURE: CPT

## 2024-07-25 PROCEDURE — 83721 ASSAY OF BLOOD LIPOPROTEIN: CPT

## 2024-07-26 ENCOUNTER — APPOINTMENT (OUTPATIENT)
Dept: PRIMARY CARE | Facility: CLINIC | Age: 64
End: 2024-07-26
Payer: COMMERCIAL

## 2024-07-26 VITALS
HEART RATE: 77 BPM | TEMPERATURE: 98.3 F | SYSTOLIC BLOOD PRESSURE: 122 MMHG | WEIGHT: 192.4 LBS | DIASTOLIC BLOOD PRESSURE: 80 MMHG | RESPIRATION RATE: 12 BRPM | BODY MASS INDEX: 43.28 KG/M2 | HEIGHT: 56 IN | OXYGEN SATURATION: 95 %

## 2024-07-26 DIAGNOSIS — F43.10 PTSD (POST-TRAUMATIC STRESS DISORDER): ICD-10-CM

## 2024-07-26 DIAGNOSIS — F33.2 SEVERE EPISODE OF RECURRENT MAJOR DEPRESSIVE DISORDER, WITHOUT PSYCHOTIC FEATURES (MULTI): ICD-10-CM

## 2024-07-26 DIAGNOSIS — Z63.9 FAMILY CIRCUMSTANCE: ICD-10-CM

## 2024-07-26 DIAGNOSIS — M54.2 CERVICAL PAIN: ICD-10-CM

## 2024-07-26 DIAGNOSIS — E11.69 TYPE 2 DIABETES MELLITUS WITH OTHER SPECIFIED COMPLICATION, WITHOUT LONG-TERM CURRENT USE OF INSULIN (MULTI): ICD-10-CM

## 2024-07-26 DIAGNOSIS — E11.618 TYPE 2 DIABETES MELLITUS WITH OTHER DIABETIC ARTHROPATHY, WITHOUT LONG-TERM CURRENT USE OF INSULIN (MULTI): Primary | ICD-10-CM

## 2024-07-26 LAB
ALBUMIN SERPL BCP-MCNC: 4.2 G/DL (ref 3.4–5)
ALP SERPL-CCNC: 99 U/L (ref 33–136)
ALT SERPL W P-5'-P-CCNC: 21 U/L (ref 7–45)
ANION GAP SERPL CALC-SCNC: 13 MMOL/L (ref 10–20)
AST SERPL W P-5'-P-CCNC: 20 U/L (ref 9–39)
BILIRUB SERPL-MCNC: 0.4 MG/DL (ref 0–1.2)
BUN SERPL-MCNC: 20 MG/DL (ref 6–23)
CALCIUM SERPL-MCNC: 9.4 MG/DL (ref 8.6–10.6)
CHLORIDE SERPL-SCNC: 105 MMOL/L (ref 98–107)
CO2 SERPL-SCNC: 27 MMOL/L (ref 21–32)
CREAT SERPL-MCNC: 0.71 MG/DL (ref 0.5–1.05)
CREAT UR-MCNC: 61.6 MG/DL (ref 20–320)
EGFRCR SERPLBLD CKD-EPI 2021: >90 ML/MIN/1.73M*2
EST. AVERAGE GLUCOSE BLD GHB EST-MCNC: 160 MG/DL
GLUCOSE SERPL-MCNC: 103 MG/DL (ref 74–99)
HBA1C MFR BLD: 7.2 %
LDLC SERPL DIRECT ASSAY-MCNC: 121 MG/DL (ref 0–129)
MICROALBUMIN UR-MCNC: <7 MG/L
MICROALBUMIN/CREAT UR: NORMAL MG/G{CREAT}
POTASSIUM SERPL-SCNC: 4.1 MMOL/L (ref 3.5–5.3)
PROT SERPL-MCNC: 7.3 G/DL (ref 6.4–8.2)
SODIUM SERPL-SCNC: 141 MMOL/L (ref 136–145)

## 2024-07-26 PROCEDURE — 3074F SYST BP LT 130 MM HG: CPT | Performed by: NURSE PRACTITIONER

## 2024-07-26 PROCEDURE — 3051F HG A1C>EQUAL 7.0%<8.0%: CPT | Performed by: NURSE PRACTITIONER

## 2024-07-26 PROCEDURE — RXMED WILLOW AMBULATORY MEDICATION CHARGE

## 2024-07-26 PROCEDURE — 4010F ACE/ARB THERAPY RXD/TAKEN: CPT | Performed by: NURSE PRACTITIONER

## 2024-07-26 PROCEDURE — 3062F POS MACROALBUMINURIA REV: CPT | Performed by: NURSE PRACTITIONER

## 2024-07-26 PROCEDURE — 3079F DIAST BP 80-89 MM HG: CPT | Performed by: NURSE PRACTITIONER

## 2024-07-26 PROCEDURE — 99214 OFFICE O/P EST MOD 30 MIN: CPT | Performed by: NURSE PRACTITIONER

## 2024-07-26 PROCEDURE — 3008F BODY MASS INDEX DOCD: CPT | Performed by: NURSE PRACTITIONER

## 2024-07-26 PROCEDURE — 3049F LDL-C 100-129 MG/DL: CPT | Performed by: NURSE PRACTITIONER

## 2024-07-26 RX ORDER — DULAGLUTIDE 4.5 MG/.5ML
4.5 INJECTION, SOLUTION SUBCUTANEOUS
Qty: 2 ML | Refills: 11 | Status: SHIPPED | OUTPATIENT
Start: 2024-07-28

## 2024-07-26 RX ORDER — ESCITALOPRAM OXALATE 10 MG/1
TABLET ORAL
Qty: 60 TABLET | Refills: 2 | Status: SHIPPED | OUTPATIENT
Start: 2024-07-26

## 2024-07-26 RX ORDER — DAPAGLIFLOZIN 10 MG/1
10 TABLET, FILM COATED ORAL DAILY
Qty: 90 TABLET | Refills: 0 | Status: SHIPPED | OUTPATIENT
Start: 2024-07-26 | End: 2024-08-05 | Stop reason: SDUPTHER

## 2024-07-26 RX ORDER — CYCLOBENZAPRINE HCL 10 MG
10 TABLET ORAL NIGHTLY PRN
Qty: 60 TABLET | Refills: 1 | Status: SHIPPED | OUTPATIENT
Start: 2024-07-26

## 2024-07-26 RX ORDER — CYCLOBENZAPRINE HCL 10 MG
10 TABLET ORAL NIGHTLY PRN
Qty: 60 TABLET | Refills: 1 | Status: SHIPPED | OUTPATIENT
Start: 2024-07-26 | End: 2024-07-26 | Stop reason: WASHOUT

## 2024-07-26 SDOH — SOCIAL STABILITY - SOCIAL INSECURITY: PROBLEM RELATED TO PRIMARY SUPPORT GROUP, UNSPECIFIED: Z63.9

## 2024-07-26 ASSESSMENT — PATIENT HEALTH QUESTIONNAIRE - PHQ9
2. FEELING DOWN, DEPRESSED OR HOPELESS: NOT AT ALL
SUM OF ALL RESPONSES TO PHQ9 QUESTIONS 1 AND 2: 0
1. LITTLE INTEREST OR PLEASURE IN DOING THINGS: NOT AT ALL

## 2024-07-26 NOTE — PROGRESS NOTES
"Subjective   Patient ID: Cora Acosta is a 63 y.o. female who presents for Follow-up (Follow up- lab results, discuss medication lexapro).    HPI  the patient has had an elevated aic due to getting off her meds due to depression   She has a son that has been in and out of residential who has adhd , she has a  who does not take his medication due to his issues   She internalized it and now is better able to let it go   Her mother was an alcoholic   Her son picked up smoking   She likes lexapro she enjoys it and thinks it is helping her    She likes her jobs that she is doing    Her son has had a masters and is very smart   Her aic level is close to normal now much improved and her blood pressure is normal   Her bmi is up a little     Review of Systems Negative except what was mentioned in the HPI       Objective   /80 (Patient Position: Sitting)   Pulse 77   Temp 36.8 °C (98.3 °F)   Resp 12   Ht 1.422 m (4' 8\")   Wt 87.3 kg (192 lb 6.4 oz)   SpO2 95%   BMI 43.14 kg/m²     Physical Exam  Vitals and nursing note reviewed.   Constitutional:       Appearance: Normal appearance.   HENT:      Head: Normocephalic and atraumatic.      Right Ear: Tympanic membrane normal.      Left Ear: Tympanic membrane normal.      Nose: Nose normal.      Mouth/Throat:      Mouth: Mucous membranes are moist.   Eyes:      Extraocular Movements: Extraocular movements intact.      Conjunctiva/sclera: Conjunctivae normal.   Cardiovascular:      Rate and Rhythm: Normal rate and regular rhythm.      Pulses: Normal pulses.      Heart sounds: Normal heart sounds.   Pulmonary:      Effort: Pulmonary effort is normal.      Breath sounds: Normal breath sounds.   Abdominal:      General: Abdomen is flat. Bowel sounds are normal.      Palpations: Abdomen is soft.   Musculoskeletal:         General: Normal range of motion.      Cervical back: Normal range of motion and neck supple.   Skin:     General: Skin is warm and dry.   Neurological: "      General: No focal deficit present.      Mental Status: She is alert and oriented to person, place, and time. Mental status is at baseline.   Psychiatric:         Mood and Affect: Mood normal.         Behavior: Behavior normal.         Thought Content: Thought content normal.         Judgment: Judgment normal.         Assessment/Plan

## 2024-07-26 NOTE — PATIENT INSTRUCTIONS
Look at [facility] for add and adhd     Follow up in 3 months     Have your son follow up with [facility]

## 2024-07-31 ENCOUNTER — TELEPHONE (OUTPATIENT)
Dept: PRIMARY CARE | Facility: CLINIC | Age: 64
End: 2024-07-31

## 2024-07-31 NOTE — PROGRESS NOTES
CHW phoned patient. Patient was given the number to [employer], [phone], for her son. Patient stated her son is unemployed and needs health insurance.

## 2024-08-03 ENCOUNTER — PHARMACY VISIT (OUTPATIENT)
Dept: PHARMACY | Facility: CLINIC | Age: 64
End: 2024-08-03
Payer: MEDICARE

## 2024-08-05 ENCOUNTER — APPOINTMENT (OUTPATIENT)
Dept: PHARMACY | Facility: HOSPITAL | Age: 64
End: 2024-08-05
Payer: COMMERCIAL

## 2024-08-05 DIAGNOSIS — E11.69 TYPE 2 DIABETES MELLITUS WITH OTHER SPECIFIED COMPLICATION, WITHOUT LONG-TERM CURRENT USE OF INSULIN (MULTI): ICD-10-CM

## 2024-08-05 DIAGNOSIS — E11.618 TYPE 2 DIABETES MELLITUS WITH OTHER DIABETIC ARTHROPATHY, WITHOUT LONG-TERM CURRENT USE OF INSULIN (MULTI): ICD-10-CM

## 2024-08-05 RX ORDER — DAPAGLIFLOZIN 10 MG/1
10 TABLET, FILM COATED ORAL DAILY
Qty: 90 TABLET | Refills: 3 | Status: SHIPPED | OUTPATIENT
Start: 2024-08-05

## 2024-08-05 NOTE — ASSESSMENT & PLAN NOTE
Home BG within range. A1C elevated but was taken when without Trulicity for a few months.     Continue   Metformin  mg - 1 tablet daily   Trulicity 4.5 mg/0.5 mL - once weekly

## 2024-08-05 NOTE — PROGRESS NOTES
Subjective     Patient ID: Cora Acosta is a 63 y.o. female who presents for No chief complaint on file..    Referring Provider: Loulou Rosa APRN*     Diabetes  She presents for her follow-up diabetic visit. She has type 2 diabetes mellitus. Her disease course has been worsening.     Patient was increased to Trulicity 4.5 from 1.5 mg dose by PCP. Has taken 1-2 doses and tolerating well.     Diet: Generally unhealthy    Exercise: Patient plans to start water exercise classes in 6 weeks when recovered from shoulder surgery. Trouble walking due to knee pain.       Allergies   Allergen Reactions    Erythromycin GI Upset    Animal Dander Unknown    Codeine Nausea Only    Meperidine Nausea Only    Mold Unknown    Pollen Extracts Unknown       Objective     Current DM Pharmacotherapy:   Metformin  mg - 1 tablet daily   Trulicity 4.5 mg/0.5 mL - once weekly     SECONDARY PREVENTION  - Statin? Yes  - ACE-I/ARB? Yes  - Aspirin? No    Current monitoring regimen:   Patient is using: glucometer    Testing frequency: Ocassionally    SMBG Readings: Reports random readings within range at home    Any episodes of hypoglycemia? No  Hypoglycemia awareness? Yes      Pertinent PMH Review:  - PMH of Pancreatitis: No  - PMH/FH of Medullary Thyroid Cancer: No  - PMH of Retinopathy: No  - PMH of Urinary Tract Infections: No    Lab Review  Lab Results   Component Value Date    BILITOT 0.4 07/25/2024    CALCIUM 9.4 07/25/2024    CO2 27 07/25/2024     07/25/2024    CREATININE 0.71 07/25/2024    GLUCOSE 103 (H) 07/25/2024    ALKPHOS 99 07/25/2024    K 4.1 07/25/2024    PROT 7.3 07/25/2024     07/25/2024    AST 20 07/25/2024    ALT 21 07/25/2024    BUN 20 07/25/2024    ANIONGAP 13 07/25/2024    ALBUMIN 4.2 07/25/2024     Lab Results   Component Value Date    CHOL 175 12/15/2020    HDL 51.7 12/15/2020     Lab Results   Component Value Date    HGBA1C 7.2 (H) 07/25/2024     The ASCVD Risk score (Sven WESLEY, et al., 2019)  failed to calculate for the following reasons:    Cannot find a previous HDL lab    Cannot find a previous total cholesterol lab      Assessment/Plan     Problem List Items Addressed This Visit       Diabetes mellitus, type 2 (Multi)     Home BG within range. A1C elevated but was taken when without Trulicity for a few months.     Continue   Metformin  mg - 1 tablet daily   Trulicity 4.5 mg/0.5 mL - once weekly             Type 2 diabetes mellitus, is not at goal. Goal A1C: <7%    Follow up: I recommend diabetes care be as needed.     Olesya StacyD formerly [facility]  Clinical Pharmacy Specialist, Primary Care     Continue all meds under the continuation of care with the referring provider and clinical pharmacy team

## 2024-09-23 ENCOUNTER — TELEPHONE (OUTPATIENT)
Dept: PRIMARY CARE | Facility: CLINIC | Age: 64
End: 2024-09-23

## 2024-09-23 NOTE — TELEPHONE ENCOUNTER
Patient fell 3 weeks ago, injured her head,  Felt fine, never went to UC/ED  States she started feeling dizzy, nauseous,  And somewhat

## 2024-09-24 PROBLEM — M75.00 DIABETIC FROZEN SHOULDER ASSOCIATED WITH TYPE 2 DIABETES MELLITUS (MULTI): Status: ACTIVE | Noted: 2024-02-15

## 2024-09-24 PROBLEM — E11.618 DIABETIC FROZEN SHOULDER ASSOCIATED WITH TYPE 2 DIABETES MELLITUS (MULTI): Status: ACTIVE | Noted: 2024-02-15

## 2024-09-25 ENCOUNTER — OFFICE VISIT (OUTPATIENT)
Dept: PRIMARY CARE | Facility: CLINIC | Age: 64
End: 2024-09-25
Payer: COMMERCIAL

## 2024-09-25 VITALS
DIASTOLIC BLOOD PRESSURE: 60 MMHG | RESPIRATION RATE: 20 BRPM | SYSTOLIC BLOOD PRESSURE: 108 MMHG | TEMPERATURE: 96.4 F | OXYGEN SATURATION: 98 % | WEIGHT: 190.1 LBS | BODY MASS INDEX: 42.62 KG/M2 | HEART RATE: 71 BPM

## 2024-09-25 DIAGNOSIS — S05.91XA RIGHT ORBIT TRAUMA, INITIAL ENCOUNTER: ICD-10-CM

## 2024-09-25 DIAGNOSIS — F33.2 SEVERE EPISODE OF RECURRENT MAJOR DEPRESSIVE DISORDER, WITHOUT PSYCHOTIC FEATURES (MULTI): ICD-10-CM

## 2024-09-25 DIAGNOSIS — F43.10 PTSD (POST-TRAUMATIC STRESS DISORDER): ICD-10-CM

## 2024-09-25 DIAGNOSIS — S09.93XA FACIAL INJURY, INITIAL ENCOUNTER: ICD-10-CM

## 2024-09-25 DIAGNOSIS — E78.2 MIXED HYPERLIPIDEMIA: ICD-10-CM

## 2024-09-25 DIAGNOSIS — R11.0 NAUSEA: ICD-10-CM

## 2024-09-25 DIAGNOSIS — S06.0X1A CONCUSSION WITH LOSS OF CONSCIOUSNESS OF 30 MINUTES OR LESS, INITIAL ENCOUNTER: ICD-10-CM

## 2024-09-25 DIAGNOSIS — S09.90XA INJURY OF HEAD, INITIAL ENCOUNTER: Primary | ICD-10-CM

## 2024-09-25 DIAGNOSIS — M79.642 LEFT HAND PAIN: ICD-10-CM

## 2024-09-25 PROCEDURE — 3074F SYST BP LT 130 MM HG: CPT | Performed by: NURSE PRACTITIONER

## 2024-09-25 PROCEDURE — 3051F HG A1C>EQUAL 7.0%<8.0%: CPT | Performed by: NURSE PRACTITIONER

## 2024-09-25 PROCEDURE — 3062F POS MACROALBUMINURIA REV: CPT | Performed by: NURSE PRACTITIONER

## 2024-09-25 PROCEDURE — 3078F DIAST BP <80 MM HG: CPT | Performed by: NURSE PRACTITIONER

## 2024-09-25 PROCEDURE — 3049F LDL-C 100-129 MG/DL: CPT | Performed by: NURSE PRACTITIONER

## 2024-09-25 PROCEDURE — 99215 OFFICE O/P EST HI 40 MIN: CPT | Performed by: NURSE PRACTITIONER

## 2024-09-25 PROCEDURE — 4010F ACE/ARB THERAPY RXD/TAKEN: CPT | Performed by: NURSE PRACTITIONER

## 2024-09-25 RX ORDER — ONDANSETRON 8 MG/1
8 TABLET, ORALLY DISINTEGRATING ORAL EVERY 8 HOURS PRN
Qty: 40 TABLET | Refills: 3 | Status: SHIPPED | OUTPATIENT
Start: 2024-09-25

## 2024-09-25 RX ORDER — BUPROPION HYDROCHLORIDE 150 MG/1
TABLET ORAL
Qty: 180 TABLET | Refills: 2 | Status: SHIPPED | OUTPATIENT
Start: 2024-09-25

## 2024-09-25 RX ORDER — DULOXETIN HYDROCHLORIDE 60 MG/1
60 CAPSULE, DELAYED RELEASE ORAL DAILY
Qty: 90 CAPSULE | Refills: 3 | Status: SHIPPED | OUTPATIENT
Start: 2024-09-25

## 2024-09-25 ASSESSMENT — ENCOUNTER SYMPTOMS
LOSS OF SENSATION IN FEET: 0
DEPRESSION: 0
OCCASIONAL FEELINGS OF UNSTEADINESS: 0

## 2024-09-25 ASSESSMENT — PATIENT HEALTH QUESTIONNAIRE - PHQ9
1. LITTLE INTEREST OR PLEASURE IN DOING THINGS: NOT AT ALL
SUM OF ALL RESPONSES TO PHQ9 QUESTIONS 1 AND 2: 0
2. FEELING DOWN, DEPRESSED OR HOPELESS: NOT AT ALL

## 2024-09-25 NOTE — PROGRESS NOTES
Subjective   Patient ID: Cora Acosta is a 63 y.o. female who presents for Fall.    Fall      the patient walked off the top of a 2 stepper and fell    She  felt stunned for a few minutes , Was out  she had a laceration of the right forehead . She did not realize that she also injured the right orbit   She had swelling over the , she was ok and bandaged it up she drove to the [employer] and went to work . She slowly started to get dizzy . She works around [employer] and got nauseated . She can't control her body temperature . She has hot flashes and then chills . She has no neck stiffness now .  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   Her  has been dx with parkinsonism , he is an alcoholic and wants to save her time  off to take care of him  she was stunned and had been lying on the floor for less than 10 minutes   She feels a little dizzy still but feels ok to drive and work   Her ecchymoses came 1 week after the injury  She is concerned about her sight and wants to get a ct of the head   She feels like she has to work now to take care of her family  She feels like she is safe to drive     Review of Systems Negative except what was mentioned in the HPI       Objective   /60   Pulse 71   Temp 35.8 °C (96.4 °F)   Resp 20   Wt 86.2 kg (190 lb 1.6 oz)   SpO2 98%   BMI 42.62 kg/m²     Physical Exam  Vitals and nursing note reviewed.   Constitutional:       Appearance: Normal appearance.   HENT:      Head: Normocephalic and atraumatic.      Right Ear: Tympanic membrane normal.      Left Ear: Tympanic membrane normal.      Nose: Nose normal.      Mouth/Throat:      Mouth: Mucous membranes are moist.   Eyes:      Extraocular Movements: Extraocular movements intact.      Conjunctiva/sclera: Conjunctivae normal.        Comments: Ecchymosis over the r orbit        Cardiovascular:      Rate and Rhythm:  Normal rate and regular rhythm.      Pulses: Normal pulses.      Heart sounds: Normal heart sounds.   Pulmonary:      Effort: Pulmonary effort is normal.      Breath sounds: Normal breath sounds.   Abdominal:      General: Abdomen is flat. Bowel sounds are normal.      Palpations: Abdomen is soft.   Musculoskeletal:         General: Normal range of motion.      Cervical back: Normal range of motion and neck supple.      Comments: Tenderness over the right hand and thumb   Skin:     General: Skin is warm and dry.      Comments: Ecchymoses over the right cheek   Right facial swelling   Laceration over the right forehead   Neurological:      General: No focal deficit present.      Mental Status: She is alert and oriented to person, place, and time. Mental status is at baseline.   Psychiatric:         Mood and Affect: Mood normal.         Behavior: Behavior normal.         Thought Content: Thought content normal.         Judgment: Judgment normal.         Assessment/Plan   Problem List Items Addressed This Visit             ICD-10-CM    Hyperlipidemia E78.5    Relevant Orders    LDL cholesterol, direct    Severe episode of recurrent major depressive disorder, without psychotic features (Multi) F33.2    Relevant Medications    DULoxetine (Cymbalta) 60 mg DR capsule    PTSD (post-traumatic stress disorder) F43.10    Relevant Medications    buPROPion XL (Wellbutrin XL) 150 mg 24 hr tablet     Other Visit Diagnoses         Codes    Injury of head, initial encounter    -  Primary S09.90XA    Relevant Orders    CT head wo IV contrast    Concussion with loss of consciousness of 30 minutes or less, initial encounter     S06.0X1A    Relevant Orders    CT head wo IV contrast    Nausea     R11.0    Relevant Medications    ondansetron ODT (Zofran-ODT) 8 mg disintegrating tablet    Facial injury, initial encounter     S09.93XA    Left hand pain     M79.642    Relevant Orders    XR hand left 3+ views

## 2024-10-08 ENCOUNTER — HOSPITAL ENCOUNTER (OUTPATIENT)
Dept: RADIOLOGY | Facility: CLINIC | Age: 64
Discharge: HOME | End: 2024-10-08
Payer: COMMERCIAL

## 2024-10-08 DIAGNOSIS — S09.90XA INJURY OF HEAD, INITIAL ENCOUNTER: ICD-10-CM

## 2024-10-08 DIAGNOSIS — S06.0X1A CONCUSSION WITH LOSS OF CONSCIOUSNESS OF 30 MINUTES OR LESS, INITIAL ENCOUNTER: ICD-10-CM

## 2024-10-08 PROCEDURE — 70450 CT HEAD/BRAIN W/O DYE: CPT

## 2024-10-08 PROCEDURE — 70450 CT HEAD/BRAIN W/O DYE: CPT | Performed by: RADIOLOGY

## 2024-10-16 ENCOUNTER — APPOINTMENT (OUTPATIENT)
Dept: RADIOLOGY | Facility: HOSPITAL | Age: 64
End: 2024-10-16
Payer: COMMERCIAL

## 2024-10-30 ENCOUNTER — APPOINTMENT (OUTPATIENT)
Dept: PRIMARY CARE | Facility: CLINIC | Age: 64
End: 2024-10-30
Payer: COMMERCIAL

## 2024-11-01 ENCOUNTER — APPOINTMENT (OUTPATIENT)
Dept: PRIMARY CARE | Facility: CLINIC | Age: 64
End: 2024-11-01
Payer: COMMERCIAL

## 2024-11-01 VITALS
WEIGHT: 203 LBS | DIASTOLIC BLOOD PRESSURE: 68 MMHG | HEART RATE: 84 BPM | OXYGEN SATURATION: 98 % | BODY MASS INDEX: 45.67 KG/M2 | RESPIRATION RATE: 16 BRPM | TEMPERATURE: 97 F | SYSTOLIC BLOOD PRESSURE: 118 MMHG | HEIGHT: 56 IN

## 2024-11-01 DIAGNOSIS — J45.20 MILD INTERMITTENT ASTHMA, UNSPECIFIED WHETHER COMPLICATED (HHS-HCC): Primary | ICD-10-CM

## 2024-11-01 DIAGNOSIS — E11.618 TYPE 2 DIABETES MELLITUS WITH OTHER DIABETIC ARTHROPATHY, WITHOUT LONG-TERM CURRENT USE OF INSULIN: ICD-10-CM

## 2024-11-01 DIAGNOSIS — R11.0 NAUSEA: ICD-10-CM

## 2024-11-01 DIAGNOSIS — J45.998 POST-VIRAL REACTIVE AIRWAY DISEASE (HHS-HCC): ICD-10-CM

## 2024-11-01 DIAGNOSIS — E03.9 HYPOTHYROIDISM, UNSPECIFIED TYPE: ICD-10-CM

## 2024-11-01 PROCEDURE — 4010F ACE/ARB THERAPY RXD/TAKEN: CPT | Performed by: NURSE PRACTITIONER

## 2024-11-01 PROCEDURE — 99214 OFFICE O/P EST MOD 30 MIN: CPT | Performed by: NURSE PRACTITIONER

## 2024-11-01 PROCEDURE — 3062F POS MACROALBUMINURIA REV: CPT | Performed by: NURSE PRACTITIONER

## 2024-11-01 PROCEDURE — 3008F BODY MASS INDEX DOCD: CPT | Performed by: NURSE PRACTITIONER

## 2024-11-01 PROCEDURE — 3078F DIAST BP <80 MM HG: CPT | Performed by: NURSE PRACTITIONER

## 2024-11-01 PROCEDURE — 3074F SYST BP LT 130 MM HG: CPT | Performed by: NURSE PRACTITIONER

## 2024-11-01 PROCEDURE — 3049F LDL-C 100-129 MG/DL: CPT | Performed by: NURSE PRACTITIONER

## 2024-11-01 PROCEDURE — 3051F HG A1C>EQUAL 7.0%<8.0%: CPT | Performed by: NURSE PRACTITIONER

## 2024-11-01 RX ORDER — ONDANSETRON 8 MG/1
8 TABLET, ORALLY DISINTEGRATING ORAL EVERY 8 HOURS PRN
Qty: 40 TABLET | Refills: 3 | Status: SHIPPED | OUTPATIENT
Start: 2024-11-01

## 2024-11-01 RX ORDER — METFORMIN HYDROCHLORIDE 500 MG/1
TABLET, EXTENDED RELEASE ORAL
COMMUNITY
Start: 2023-11-22 | End: 2024-11-11 | Stop reason: ALTCHOICE

## 2024-11-01 RX ORDER — SEMAGLUTIDE 2.68 MG/ML
INJECTION, SOLUTION SUBCUTANEOUS
COMMUNITY
Start: 2024-05-08 | End: 2024-11-11 | Stop reason: DRUGHIGH

## 2024-11-01 RX ORDER — TRAMADOL HYDROCHLORIDE 50 MG/1
TABLET ORAL
COMMUNITY
Start: 2023-12-15 | End: 2024-11-11 | Stop reason: WASHOUT

## 2024-11-01 ASSESSMENT — PATIENT HEALTH QUESTIONNAIRE - PHQ9
SUM OF ALL RESPONSES TO PHQ9 QUESTIONS 1 AND 2: 0
1. LITTLE INTEREST OR PLEASURE IN DOING THINGS: NOT AT ALL
2. FEELING DOWN, DEPRESSED OR HOPELESS: NOT AT ALL

## 2024-11-01 NOTE — PROGRESS NOTES
"Subjective   Patient ID: Cora Acosta is a 63 y.o. female who presents for Follow-up (3mt fuv).    HPI  the patient with diabetes , chronic depression and family relationship problems , hypertension , head injury asthma and obesity presents for a follow up visit .   She found a therapist for herself who she is happy with   She wants to be on ozempic instead of trulcity . I explained that it is what her insurance will cover .   Her last aic level of 7.2 . It had improved after she had stopped all medication   She wants to help her son , who has a alcohol problem uses marijuana and crack cocaine   He lost his job and finished one month and is on an  iop .   Her  is on disability and is an alcoholic   She is the financial supporter of the family .   She is coping better now   She is still having problems with a cough from her asthma   Review of Systems Negative except what was mentioned in the HPI       Objective   /68 (Patient Position: Sitting)   Pulse 84   Temp 36.1 °C (97 °F)   Resp 16   Ht 1.422 m (4' 8\")   Wt 92.1 kg (203 lb)   SpO2 98%   BMI 45.51 kg/m²     Physical Exam  Vitals and nursing note reviewed.   Constitutional:       Appearance: Normal appearance.   HENT:      Head: Normocephalic and atraumatic.      Right Ear: Tympanic membrane normal.      Left Ear: Tympanic membrane normal.      Nose: Nose normal.      Mouth/Throat:      Mouth: Mucous membranes are moist.   Eyes:      Extraocular Movements: Extraocular movements intact.      Conjunctiva/sclera: Conjunctivae normal.   Cardiovascular:      Rate and Rhythm: Normal rate and regular rhythm.      Pulses: Normal pulses.      Heart sounds: Normal heart sounds.   Pulmonary:      Effort: Pulmonary effort is normal.      Breath sounds: Normal breath sounds.   Abdominal:      General: Abdomen is flat. Bowel sounds are normal.      Palpations: Abdomen is soft.   Musculoskeletal:         General: Normal range of motion.      Cervical back: " Normal range of motion and neck supple.   Skin:     General: Skin is warm and dry.   Neurological:      General: No focal deficit present.      Mental Status: She is alert and oriented to person, place, and time. Mental status is at baseline.   Psychiatric:         Mood and Affect: Mood normal.         Behavior: Behavior normal.         Thought Content: Thought content normal.         Judgment: Judgment normal.         Assessment/Plan   Problem List Items Addressed This Visit             ICD-10-CM    Diabetes mellitus, type 2 (Multi) E11.9    Relevant Medications    semaglutide 0.25 mg or 0.5 mg (2 mg/3 mL) pen injector    Other Relevant Orders    Referral to Clinical Pharmacy    Hypothyroidism E03.9    Relevant Orders    Tsh With Reflex To Free T4 If Abnormal     Other Visit Diagnoses         Codes    Mild intermittent asthma, unspecified whether complicated ([facility])    -  Primary J45.20    Relevant Medications    albuterol-budesonide (Airsupra) 90-80 mcg/actuation inhaler    Post-viral reactive airway disease ([facility])     J45.998    Nausea     R11.0    Relevant Medications    ondansetron ODT (Zofran-ODT) 8 mg disintegrating tablet

## 2024-11-01 NOTE — PATIENT INSTRUCTIONS
If you need to stick to ozempic or trulicity ask the provider to refer you to a pharmacist   Have a  that understands add or adhd  Part of procrastination is add or adhd   Break things down into baby steps and baby expectations   Only handle what you can take care of in a day  Have fun with your  doing something   Try to connect with [facility] to increase the ozempic dose

## 2024-11-11 ENCOUNTER — APPOINTMENT (OUTPATIENT)
Dept: PHARMACY | Facility: HOSPITAL | Age: 64
End: 2024-11-11
Payer: COMMERCIAL

## 2024-11-11 DIAGNOSIS — E11.618 TYPE 2 DIABETES MELLITUS WITH OTHER DIABETIC ARTHROPATHY, WITHOUT LONG-TERM CURRENT USE OF INSULIN: ICD-10-CM

## 2024-11-11 RX ORDER — ACETAMINOPHEN 500 MG
5000 TABLET ORAL DAILY
COMMUNITY

## 2024-11-11 RX ORDER — CHLORHEXIDINE GLUCONATE 4 %
3 LIQUID (ML) TOPICAL NIGHTLY PRN
COMMUNITY

## 2024-11-11 RX ORDER — NAPROXEN 250 MG/1
750 TABLET ORAL NIGHTLY
COMMUNITY

## 2024-11-11 NOTE — PROGRESS NOTES
"Subjective     Patient ID: Cora Acosta is a 63 y.o. female who presents for No chief complaint on file..    Referring Provider: Loulou Rosa APRN*     Diabetes  She presents for her follow-up diabetic visit. She has type 2 diabetes mellitus. Her disease course has been worsening.     Diet: \"off and on- horrid and then not so bad\" - working on self care     Exercise: Patient plans to start water exercise classes in 6 weeks when recovered from shoulder surgery. Trouble walking due to knee pain.     Other supplements: Glucosamine and Tumeric and Fiber pills       Allergies   Allergen Reactions    Erythromycin GI Upset    Animal Dander Unknown    Codeine Nausea Only    Meperidine Nausea Only    Mold Unknown    Pollen Extracts Unknown       Objective     Current DM Pharmacotherapy:   Farxiga 10 mg - 1 tablet daily   Trulicity 4.5 mg/0.5 mL - once weekly     Medication Clarification: Took last dose of Trulicity 7-9 days ago    SECONDARY PREVENTION  - Statin? Yes  - ACE-I/ARB? Yes  - Aspirin? No    Current monitoring regimen:   Patient is using: glucometer    Testing frequency: Ocassionally    SMBG Readings: 1/2 hr after eating - 190 mg/dL     Any episodes of hypoglycemia? No  Hypoglycemia awareness? Yes      Pertinent PMH Review:  - PMH of Pancreatitis: No  - PMH/FH of Medullary Thyroid Cancer: No  - PMH of Retinopathy: No  - PMH of Urinary Tract Infections: No    Lab Review  Lab Results   Component Value Date    BILITOT 0.4 07/25/2024    CALCIUM 9.4 07/25/2024    CO2 27 07/25/2024     07/25/2024    CREATININE 0.71 07/25/2024    GLUCOSE 103 (H) 07/25/2024    ALKPHOS 99 07/25/2024    K 4.1 07/25/2024    PROT 7.3 07/25/2024     07/25/2024    AST 20 07/25/2024    ALT 21 07/25/2024    BUN 20 07/25/2024    ANIONGAP 13 07/25/2024    ALBUMIN 4.2 07/25/2024     Lab Results   Component Value Date    CHOL 175 12/15/2020    HDL 51.7 12/15/2020     Lab Results   Component Value Date    HGBA1C 7.2 (H) 07/25/2024 "     The ASCVD Risk score (Sven EWSLEY, et al., 2019) failed to calculate for the following reasons:    Cannot find a previous HDL lab    Cannot find a previous total cholesterol lab      Assessment/Plan     Problem List Items Addressed This Visit       Diabetes mellitus, type 2 (Multi)     Updated medication list. Patient has been off medications for some time. Took last dose of Trulicity 7-9 days ago. Will be starting Ozempic today.     START Ozempic 0.5 mg/dose- once weekly     RESTART Farxiga 10 mg - 1 tablet daily           Discussed Anxiety/Depression- currently  off all medications and will be starting with a counselor next month. Encouraged patient that if she is going to restart a medication to restart Lexapro. Recommend against Bupropion monotherapy (pt also has anxiety), recommend against starting duloxetine and Lexapro (serotonin syndrome risk), patient previously tolerated Lexapro best.     Type 2 diabetes mellitus, is not at goal. Goal A1C: <7%    Follow up: I recommend diabetes care be 2 weeks     Olesya StacyD [facility]  Clinical Pharmacy Specialist, Primary Care     Continue all meds under the continuation of care with the referring provider and clinical pharmacy team

## 2024-11-11 NOTE — ASSESSMENT & PLAN NOTE
Updated medication list. Patient has been off medications for some time. Took last dose of Trulicity 7-9 days ago. Will be starting Ozempic today.     START Ozempic 0.5 mg/dose- once weekly     RESTART Farxiga 10 mg - 1 tablet daily

## 2024-11-25 ENCOUNTER — APPOINTMENT (OUTPATIENT)
Dept: PHARMACY | Facility: HOSPITAL | Age: 64
End: 2024-11-25
Payer: COMMERCIAL

## 2024-11-25 DIAGNOSIS — E11.618 TYPE 2 DIABETES MELLITUS WITH OTHER DIABETIC ARTHROPATHY, WITHOUT LONG-TERM CURRENT USE OF INSULIN: ICD-10-CM

## 2024-11-25 NOTE — PROGRESS NOTES
"Subjective     Patient ID: Cora Acosta is a 63 y.o. female who presents for Diabetes.    Referring Provider: Loulou Rosa APRN*     Diabetes  She presents for her follow-up diabetic visit. She has type 2 diabetes mellitus. Her disease course has been worsening.     Diet: \"off and on- horrid and then not so bad\" - working on self care     Exercise: Patient plans to start water exercise classes in 6 weeks when recovered from shoulder surgery. Trouble walking due to knee pain.     Other supplements: Glucosamine and Tumeric and Fiber pills       Allergies   Allergen Reactions    Erythromycin GI Upset    Animal Dander Unknown    Codeine Nausea Only    Meperidine Nausea Only    Mold Unknown    Pollen Extracts Unknown       Objective     Current DM Pharmacotherapy:   Farxiga 10 mg - 1 tablet daily   Ozempic 0.5 mg/dose- once weekly (Saturday)    Medication Clarification: has taken 2 doses of Ozempic   ADEs:     SECONDARY PREVENTION  - Statin? Yes  - ACE-I/ARB? Yes  - Aspirin? No    Current monitoring regimen:   Patient is using: glucometer    Testing frequency: not currently- testing supplies was  - just received new supplies     SMBG Readings:       Any episodes of hypoglycemia? No  Hypoglycemia awareness? Yes      Pertinent PMH Review:  - PMH of Pancreatitis: No  - PMH/FH of Medullary Thyroid Cancer: No  - PMH of Retinopathy: No  - PMH of Urinary Tract Infections: No    Lab Review  Lab Results   Component Value Date    BILITOT 0.4 2024    CALCIUM 9.4 2024    CO2 27 2024     2024    CREATININE 0.71 2024    GLUCOSE 103 (H) 2024    ALKPHOS 99 2024    K 4.1 2024    PROT 7.3 2024     2024    AST 20 2024    ALT 21 2024    BUN 20 2024    ANIONGAP 13 2024    ALBUMIN 4.2 2024     Lab Results   Component Value Date    CHOL 175 12/15/2020    HDL 51.7 12/15/2020     Lab Results   Component Value Date    HGBA1C 7.2 (H) " "07/25/2024     The ASCVD Risk score (Sven WESLEY, et al., 2019) failed to calculate for the following reasons:    Cannot find a previous HDL lab    Cannot find a previous total cholesterol lab      Assessment/Plan     Problem List Items Addressed This Visit       Diabetes mellitus, type 2 (Multi)     Type 2 diabetes mellitus, is not at goal. Goal A1C: <7%    Has only taken 2 doses of Ozempic, had SE the first week but not week 2, however is concerned she did not inject correctly due to not having SE.     START testing BG 1-2 times daily (Fasting BG and PPG)    CONTINUE   Farxiga 10 mg - 1 tablet daily   Ozempic 0.5 mg/dose- once weekly (Saturday)    Future considerations- titration of Ozempic            Discussed Anxiety/Depression- Restarted Lexapro 10 mg almost 2 weeks ago - does not notice any efficacy yet- states \"we will have to wait for my next trigger to know\"- does still plan to connect with counselor in a week or 2.       Follow up: I recommend diabetes care be 2 weeks   Patient aware of PCP jail and need for new provider    Olesya Gomez PharmD [facility]  Clinical Pharmacy Specialist, Primary Care     Continue all meds under the continuation of care with the referring provider and clinical pharmacy team  "

## 2024-11-25 NOTE — ASSESSMENT & PLAN NOTE
Type 2 diabetes mellitus, is not at goal. Goal A1C: <7%    Has only taken 2 doses of Ozempic, had SE the first week but not week 2, however is concerned she did not inject correctly due to not having SE.     START testing BG 1-2 times daily (Fasting BG and PPG)    CONTINUE   Farxiga 10 mg - 1 tablet daily   Ozempic 0.5 mg/dose- once weekly (Saturday)    Future considerations- titration of Ozempic

## 2024-12-09 ENCOUNTER — APPOINTMENT (OUTPATIENT)
Dept: PHARMACY | Facility: HOSPITAL | Age: 64
End: 2024-12-09
Payer: COMMERCIAL

## 2024-12-30 ENCOUNTER — APPOINTMENT (OUTPATIENT)
Dept: PHARMACY | Facility: HOSPITAL | Age: 64
End: 2024-12-30
Payer: COMMERCIAL

## 2025-02-26 DIAGNOSIS — Z12.31 ENCOUNTER FOR SCREENING MAMMOGRAM FOR MALIGNANT NEOPLASM OF BREAST: ICD-10-CM

## 2025-02-26 DIAGNOSIS — I10 PRIMARY HYPERTENSION: ICD-10-CM

## 2025-02-26 DIAGNOSIS — E11.00 TYPE 2 DIABETES MELLITUS WITH HYPEROSMOLARITY WITHOUT COMA, WITHOUT LONG-TERM CURRENT USE OF INSULIN (MULTI): Primary | ICD-10-CM

## 2025-02-26 DIAGNOSIS — J45.20 MILD INTERMITTENT ASTHMA, UNSPECIFIED WHETHER COMPLICATED (HHS-HCC): ICD-10-CM

## 2025-02-26 DIAGNOSIS — F41.9 ANXIETY: ICD-10-CM

## 2025-02-26 DIAGNOSIS — E66.01 CLASS 3 SEVERE OBESITY DUE TO EXCESS CALORIES WITH SERIOUS COMORBIDITY AND BODY MASS INDEX (BMI) OF 40.0 TO 44.9 IN ADULT: ICD-10-CM

## 2025-02-26 DIAGNOSIS — Z12.11 SCREENING FOR COLON CANCER: ICD-10-CM

## 2025-02-26 DIAGNOSIS — E78.2 MIXED HYPERLIPIDEMIA: ICD-10-CM

## 2025-02-26 DIAGNOSIS — E03.8 OTHER SPECIFIED HYPOTHYROIDISM: ICD-10-CM

## 2025-02-26 DIAGNOSIS — E66.813 CLASS 3 SEVERE OBESITY DUE TO EXCESS CALORIES WITH SERIOUS COMORBIDITY AND BODY MASS INDEX (BMI) OF 40.0 TO 44.9 IN ADULT: ICD-10-CM

## 2025-02-27 ENCOUNTER — APPOINTMENT (OUTPATIENT)
Dept: PRIMARY CARE | Facility: CLINIC | Age: 65
End: 2025-02-27
Payer: COMMERCIAL

## 2025-02-27 VITALS
TEMPERATURE: 97.1 F | OXYGEN SATURATION: 98 % | DIASTOLIC BLOOD PRESSURE: 86 MMHG | RESPIRATION RATE: 20 BRPM | BODY MASS INDEX: 45.75 KG/M2 | SYSTOLIC BLOOD PRESSURE: 108 MMHG | HEIGHT: 56 IN | HEART RATE: 64 BPM | WEIGHT: 203.4 LBS

## 2025-02-27 DIAGNOSIS — E03.8 OTHER SPECIFIED HYPOTHYROIDISM: ICD-10-CM

## 2025-02-27 DIAGNOSIS — E11.00 TYPE 2 DIABETES MELLITUS WITH HYPEROSMOLARITY WITHOUT COMA, WITHOUT LONG-TERM CURRENT USE OF INSULIN (MULTI): Primary | ICD-10-CM

## 2025-02-27 DIAGNOSIS — E11.618 TYPE 2 DIABETES MELLITUS WITH OTHER DIABETIC ARTHROPATHY, WITHOUT LONG-TERM CURRENT USE OF INSULIN: Primary | ICD-10-CM

## 2025-02-27 DIAGNOSIS — Z12.31 ENCOUNTER FOR SCREENING MAMMOGRAM FOR MALIGNANT NEOPLASM OF BREAST: ICD-10-CM

## 2025-02-27 DIAGNOSIS — Z12.11 COLON CANCER SCREENING: ICD-10-CM

## 2025-02-27 DIAGNOSIS — G47.33 OSA (OBSTRUCTIVE SLEEP APNEA): ICD-10-CM

## 2025-02-27 DIAGNOSIS — E78.2 MIXED HYPERLIPIDEMIA: ICD-10-CM

## 2025-02-27 DIAGNOSIS — E11.618 TYPE 2 DIABETES MELLITUS WITH OTHER DIABETIC ARTHROPATHY, WITHOUT LONG-TERM CURRENT USE OF INSULIN: ICD-10-CM

## 2025-02-27 PROCEDURE — 99396 PREV VISIT EST AGE 40-64: CPT | Performed by: NURSE PRACTITIONER

## 2025-02-27 PROCEDURE — 3074F SYST BP LT 130 MM HG: CPT | Performed by: NURSE PRACTITIONER

## 2025-02-27 PROCEDURE — 3008F BODY MASS INDEX DOCD: CPT | Performed by: NURSE PRACTITIONER

## 2025-02-27 PROCEDURE — 3079F DIAST BP 80-89 MM HG: CPT | Performed by: NURSE PRACTITIONER

## 2025-02-27 PROCEDURE — 4010F ACE/ARB THERAPY RXD/TAKEN: CPT | Performed by: NURSE PRACTITIONER

## 2025-02-27 PROCEDURE — 1036F TOBACCO NON-USER: CPT | Performed by: NURSE PRACTITIONER

## 2025-02-27 ASSESSMENT — ANXIETY QUESTIONNAIRES
3. WORRYING TOO MUCH ABOUT DIFFERENT THINGS: MORE THAN HALF THE DAYS
4. TROUBLE RELAXING: MORE THAN HALF THE DAYS
1. FEELING NERVOUS, ANXIOUS, OR ON EDGE: MORE THAN HALF THE DAYS
IF YOU CHECKED OFF ANY PROBLEMS ON THIS QUESTIONNAIRE, HOW DIFFICULT HAVE THESE PROBLEMS MADE IT FOR YOU TO DO YOUR WORK, TAKE CARE OF THINGS AT HOME, OR GET ALONG WITH OTHER PEOPLE: SOMEWHAT DIFFICULT
5. BEING SO RESTLESS THAT IT IS HARD TO SIT STILL: SEVERAL DAYS
6. BECOMING EASILY ANNOYED OR IRRITABLE: SEVERAL DAYS
2. NOT BEING ABLE TO STOP OR CONTROL WORRYING: MORE THAN HALF THE DAYS
GAD7 TOTAL SCORE: 12
7. FEELING AFRAID AS IF SOMETHING AWFUL MIGHT HAPPEN: MORE THAN HALF THE DAYS

## 2025-02-27 ASSESSMENT — PATIENT HEALTH QUESTIONNAIRE - PHQ9
2. FEELING DOWN, DEPRESSED OR HOPELESS: MORE THAN HALF THE DAYS
4. FEELING TIRED OR HAVING LITTLE ENERGY: SEVERAL DAYS
SUM OF ALL RESPONSES TO PHQ9 QUESTIONS 1 AND 2: 4
SUM OF ALL RESPONSES TO PHQ QUESTIONS 1-9: 11
1. LITTLE INTEREST OR PLEASURE IN DOING THINGS: MORE THAN HALF THE DAYS
10. IF YOU CHECKED OFF ANY PROBLEMS, HOW DIFFICULT HAVE THESE PROBLEMS MADE IT FOR YOU TO DO YOUR WORK, TAKE CARE OF THINGS AT HOME, OR GET ALONG WITH OTHER PEOPLE: SOMEWHAT DIFFICULT
7. TROUBLE CONCENTRATING ON THINGS, SUCH AS READING THE NEWSPAPER OR WATCHING TELEVISION: SEVERAL DAYS
6. FEELING BAD ABOUT YOURSELF - OR THAT YOU ARE A FAILURE OR HAVE LET YOURSELF OR YOUR FAMILY DOWN: MORE THAN HALF THE DAYS
8. MOVING OR SPEAKING SO SLOWLY THAT OTHER PEOPLE COULD HAVE NOTICED. OR THE OPPOSITE, BEING SO FIGETY OR RESTLESS THAT YOU HAVE BEEN MOVING AROUND A LOT MORE THAN USUAL: SEVERAL DAYS
9. THOUGHTS THAT YOU WOULD BE BETTER OFF DEAD, OR OF HURTING YOURSELF: NOT AT ALL
3. TROUBLE FALLING OR STAYING ASLEEP OR SLEEPING TOO MUCH: SEVERAL DAYS
5. POOR APPETITE OR OVEREATING: SEVERAL DAYS

## 2025-02-27 ASSESSMENT — ENCOUNTER SYMPTOMS
ENDOCRINE NEGATIVE: 1
MYALGIAS: 1
ARTHRALGIAS: 1
HALLUCINATIONS: 0
GASTROINTESTINAL NEGATIVE: 1
CHILLS: 0
NECK PAIN: 0
ALLERGIC/IMMUNOLOGIC NEGATIVE: 1
JOINT SWELLING: 0
RESPIRATORY NEGATIVE: 1
FEVER: 0
FATIGUE: 1
NECK STIFFNESS: 0
SLEEP DISTURBANCE: 0
NERVOUS/ANXIOUS: 1
EYES NEGATIVE: 1
CARDIOVASCULAR NEGATIVE: 1
HEMATOLOGIC/LYMPHATIC NEGATIVE: 1
BACK PAIN: 1
NEUROLOGICAL NEGATIVE: 1

## 2025-02-27 NOTE — PROGRESS NOTES
Subjective   Patient ID: Cora Acosta is a 64 y.o. female who presents for Establish Care.  HPI  Like to establish for primary care.    Discontinued all of her medications the end of January 2025.  The only medication she is out of is the semaglutide which she has not taken since then.  Does have medications at home.  Discussed doing blood work today to see where she is at prior to restarting medications.    With type 2 diabetes on Farxiga semaglutide 0.5 mg once weekly.  Has stopped medications and of January 2025.  Last Hemoglobin A1c 7.1.  Out of semaglutide.  On doses higher than 0.25 mg experiences some nausea and vomiting.  Has plenty of Zofran at home to help manage this.    Primary hypertension and stopped daily Cozaar last month.  Denies any chest pain, palpitations, shortness of breath with exertion or swelling in legs.    With mixed hyperlipidemia anemia has not taken atorvastatin since the end of January.  Family history of heart disease.    Reports asthma/reactive airway.  Has not had asthma attack last 6 months.  Does have congestion, runny nose and sometimes cough during spring months, winter months and with colds.  Denies wheezing and chest tightness today.  Noted there is no PFTs in chart.  Has not used albuterol/budesonide since filled November 2024.    Has hypothyroid and stopped medication the end of January.  Has some fatigue but not notable.  Encouraged her to restart this medication today.    Now retired from [employer] as of the end of January 2025.  Does not feel stressed, anxious or depressed as she was during work.  Has stopped the Lexapro because it made her feel foggy minded.  Does not want to restart the medication as this side effect has resolved and she feels much better to manage her feelings.    Reviewed FARHANA-7 and PHQ-9.  Feels that she is able to manage without intervention.  Feels much better since her son is now in rehabilitation.    Is with low back pain.  Does take naproxen as needed  once daily to help reduce pain and improve function.  Back pain increases with standing and laying flat on her back.  Denies any leg pain, numbness weakness or tingling.  History of lumbar epidural steroid injection therapy.    Sister history of breast cancer in her 60s.  Last mammogram 2020.  Would like to have mammogram for screening.    No history of colon cancer in family.  Never had Cologuard/colonoscopy.  Would like to have Cologuard for screening.    Reviewed vaccines and is up-to-date.    Last OB/GYN visit many years.  Referred to OB/GYN.    Medication Documentation Review Audit       Reviewed by SYEDA Voss (Nurse Practitioner) on 02/27/25 at 0813      Medication Order Taking? Sig Documenting Provider Last Dose Status   albuterol-budesonide (Airsupra) 90-80 mcg/actuation inhaler [id number]  Inhale 2 puffs every 6 hours if needed (prn). SYEDA Patel  Active   atorvastatin (Lipitor) 80 mg tablet [id number]  TAKE ONE TABLET BY MOUTH AT BEDTIME Gayathri Davidson DO  Active   cholecalciferol (Vitamin D3) 5,000 Units tablet [id number]  Take 1 tablet (5,000 Units) by mouth once daily. Historical Provider, MD  Active   cyclobenzaprine (Flexeril) 10 mg tablet [id number]  Take 1 tablet (10 mg) by mouth as needed at bedtime for muscle spasms. SYEDA Patel  Active   dapagliflozin propanediol (Farxiga) 10 mg [id number]  Take 1 tablet (10 mg) by mouth once daily. SYEDA Patel  Active   escitalopram (Lexapro) 10 mg tablet [id number]  Take 1 tablet by mouth at bedtime for 2 weeks then increase 2 tablets by mouth at bedtime SYEDA Ptael  Active   levothyroxine (Synthroid, Levoxyl) 125 mcg tablet [id number]  TAKE ONE TABLET po Monday through Friday and take 2 tablets Saturday and Sunday SYEDA Patel  Active   losartan (Cozaar) 25 mg tablet [id number]  take 1 tablet (25 mg) by mouth once daily in the morning. Gayathri Davidson DO  Active   melatonin 12 mg  "tablet [id number]  Take 3 mg by mouth as needed at bedtime. Historical Provider, MD  Active   multivitamin-Ca-iron-minerals (Multiple Vitamin, Womens) tablet [id number]  Take 1 tablet by mouth once daily. Historical Provider, MD  Active   naproxen (Naprosyn) 250 mg tablet [id number]  Take 3 tablets (750 mg) by mouth once daily at bedtime. Historical Provider, MD  Active   omega 3-dha-epa-fish oil (Fish OiL) 360 mg-144 mg- 216 mg-1,200 mg capsule,delayed release(DR/EC) [id number]  Take 2 capsules by mouth once daily. Historical Provider, MD  Active   ondansetron ODT (Zofran-ODT) 8 mg disintegrating tablet [id number]  Take 1 tablet (8 mg) by mouth every 8 hours if needed for nausea or vomiting. SYEDA Patel  Active   semaglutide 0.25 mg or 0.5 mg (2 mg/3 mL) pen injector [id number]  Inject 0.5 mg under the skin 1 (one) time per week. SYEDA Patel  Active                     Vitals:    02/27/25 0805   BP: 108/86   Pulse: 64   Resp: 20   Temp: 36.2 °C (97.1 °F)   TempSrc: Temporal   SpO2: 98%   Weight: 92.3 kg (203 lb 6.4 oz)   Height: 1.422 m (4' 8\")      Body mass index is 45.6 kg/m².     Review of Systems   Constitutional:  Positive for fatigue. Negative for chills and fever.   HENT: Negative.     Eyes: Negative.    Respiratory: Negative.     Cardiovascular: Negative.    Gastrointestinal: Negative.    Endocrine: Negative.    Genitourinary: Negative.    Musculoskeletal:  Positive for arthralgias, back pain and myalgias. Negative for gait problem, joint swelling, neck pain and neck stiffness.   Skin: Negative.    Allergic/Immunologic: Negative.    Neurological: Negative.    Hematological: Negative.    Psychiatric/Behavioral:  Negative for hallucinations, self-injury, sleep disturbance and suicidal ideas. The patient is nervous/anxious.         Feels much better since she is retired.  Does not feel as stressed, anxious or depressed.       Objective   Physical Exam  Vitals and nursing note reviewed. "   Constitutional:       Appearance: Normal appearance.   HENT:      Head: Normocephalic and atraumatic.      Nose: Nose normal.      Mouth/Throat:      Mouth: Mucous membranes are moist.   Eyes:      Extraocular Movements: Extraocular movements intact.      Conjunctiva/sclera: Conjunctivae normal.      Pupils: Pupils are equal, round, and reactive to light.   Cardiovascular:      Rate and Rhythm: Normal rate and regular rhythm.      Pulses: Normal pulses.      Heart sounds: Normal heart sounds. No murmur heard.     No friction rub. No gallop.   Pulmonary:      Effort: Pulmonary effort is normal. No respiratory distress.      Breath sounds: Normal breath sounds. No wheezing.   Abdominal:      General: Abdomen is flat. Bowel sounds are normal.      Palpations: Abdomen is soft.   Musculoskeletal:      Cervical back: Neck supple. No tenderness.      Right lower leg: No edema.      Left lower leg: No edema.   Lymphadenopathy:      Cervical: No cervical adenopathy.   Skin:     General: Skin is warm and dry.      Capillary Refill: Capillary refill takes less than 2 seconds.   Neurological:      General: No focal deficit present.      Mental Status: She is alert and oriented to person, place, and time.      Cranial Nerves: No cranial nerve deficit.      Sensory: No sensory deficit.      Motor: No weakness.      Gait: Gait normal.   Psychiatric:         Mood and Affect: Mood normal.         Behavior: Behavior normal.       Assessment/Plan   Problem List Items Addressed This Visit             ICD-10-CM    Diabetes mellitus, type 2 (Multi) - Primary E11.9    Relevant Medications    semaglutide 0.25 mg or 0.5 mg (2 mg/3 mL) pen injector  Consider restarting Farxiga pending hemoglobin A1c and CMP results        Hemoglobin A1C    Comprehensive Metabolic Panel    CBC and Auto Differential    TSH with reflex to Free T4 if abnormal    Hyperlipidemia E78.5    Relevant Orders    Lipid Panel results and visit because you are looking at  this is fibromyalgia    Hypothyroidism E03.9    Relevant Orders    TSH with reflex to Free T4 if abnormal  Restart levothyroxine.    HEAVENLY (obstructive sleep apnea) G47.33    Relevant Orders    Hemoglobin A1C    Comprehensive Metabolic Panel    CBC and Auto Differential  Consider referring to sleep clinic for CPAP equipment.      BMI 45.0-49.9, adult (Multi)     Z68.42    Colon cancer screening     Z12.11    Relevant Orders    Cologuard® colon cancer screening    Encounter for screening mammogram for malignant neoplasm of breast     Z12.31    Relevant Orders     BI mammo bilateral diagnostic                 JC Voss-CNP 02/27/25 9:03 AM

## 2025-02-27 NOTE — PROGRESS NOTES
Placing referral to clinical pharmacy with permission of new PCP. This is a continuation of care.     Olesya StacyD, BCACP  Clinical Pharmacy Specialist, Primary Care

## 2025-02-28 LAB
ALBUMIN SERPL-MCNC: 4.1 G/DL (ref 3.6–5.1)
ALP SERPL-CCNC: 75 U/L (ref 37–153)
ALT SERPL-CCNC: 30 U/L (ref 6–29)
ANION GAP SERPL CALCULATED.4IONS-SCNC: 7 MMOL/L (CALC) (ref 7–17)
AST SERPL-CCNC: 20 U/L (ref 10–35)
BASOPHILS # BLD AUTO: 60 CELLS/UL (ref 0–200)
BASOPHILS NFR BLD AUTO: 0.9 %
BILIRUB SERPL-MCNC: 0.4 MG/DL (ref 0.2–1.2)
BUN SERPL-MCNC: 16 MG/DL (ref 7–25)
CALCIUM SERPL-MCNC: 9.1 MG/DL (ref 8.6–10.4)
CHLORIDE SERPL-SCNC: 102 MMOL/L (ref 98–110)
CHOLEST SERPL-MCNC: 347 MG/DL
CHOLEST/HDLC SERPL: 6.3 (CALC)
CO2 SERPL-SCNC: 29 MMOL/L (ref 20–32)
CREAT SERPL-MCNC: 0.68 MG/DL (ref 0.5–1.05)
EGFRCR SERPLBLD CKD-EPI 2021: 97 ML/MIN/1.73M2
EOSINOPHIL # BLD AUTO: 382 CELLS/UL (ref 15–500)
EOSINOPHIL NFR BLD AUTO: 5.7 %
ERYTHROCYTE [DISTWIDTH] IN BLOOD BY AUTOMATED COUNT: 13 % (ref 11–15)
EST. AVERAGE GLUCOSE BLD GHB EST-MCNC: 194 MG/DL
EST. AVERAGE GLUCOSE BLD GHB EST-SCNC: 10.8 MMOL/L
GLUCOSE SERPL-MCNC: 207 MG/DL (ref 65–99)
HBA1C MFR BLD: 8.4 % OF TOTAL HGB
HCT VFR BLD AUTO: 39.6 % (ref 35–45)
HDLC SERPL-MCNC: 55 MG/DL
HGB BLD-MCNC: 12.7 G/DL (ref 11.7–15.5)
LDLC SERPL CALC-MCNC: 261 MG/DL (CALC)
LYMPHOCYTES # BLD AUTO: 2251 CELLS/UL (ref 850–3900)
LYMPHOCYTES NFR BLD AUTO: 33.6 %
MCH RBC QN AUTO: 29.2 PG (ref 27–33)
MCHC RBC AUTO-ENTMCNC: 32.1 G/DL (ref 32–36)
MCV RBC AUTO: 91 FL (ref 80–100)
MONOCYTES # BLD AUTO: 342 CELLS/UL (ref 200–950)
MONOCYTES NFR BLD AUTO: 5.1 %
NEUTROPHILS # BLD AUTO: 3665 CELLS/UL (ref 1500–7800)
NEUTROPHILS NFR BLD AUTO: 54.7 %
NONHDLC SERPL-MCNC: 292 MG/DL (CALC)
PLATELET # BLD AUTO: 259 THOUSAND/UL (ref 140–400)
PMV BLD REES-ECKER: 10.4 FL (ref 7.5–12.5)
POTASSIUM SERPL-SCNC: 4.2 MMOL/L (ref 3.5–5.3)
PROT SERPL-MCNC: 6.8 G/DL (ref 6.1–8.1)
RBC # BLD AUTO: 4.35 MILLION/UL (ref 3.8–5.1)
SODIUM SERPL-SCNC: 138 MMOL/L (ref 135–146)
T4 FREE SERPL-MCNC: 0.3 NG/DL (ref 0.8–1.8)
TRIGL SERPL-MCNC: 149 MG/DL
TSH SERPL-ACNC: 60.72 MIU/L (ref 0.4–4.5)
WBC # BLD AUTO: 6.7 THOUSAND/UL (ref 3.8–10.8)

## 2025-03-04 DIAGNOSIS — E11.618 TYPE 2 DIABETES MELLITUS WITH OTHER DIABETIC ARTHROPATHY, WITHOUT LONG-TERM CURRENT USE OF INSULIN: ICD-10-CM

## 2025-03-04 NOTE — PROGRESS NOTES
Patient needs PA for Ozempic- will fill through  [facility] and have delivered-  PA team to work on this.     Olesya StacyD, BCACP  Clinical Pharmacy Specialist, Primary Care

## 2025-03-05 PROCEDURE — RXMED WILLOW AMBULATORY MEDICATION CHARGE

## 2025-03-11 ENCOUNTER — PHARMACY VISIT (OUTPATIENT)
Dept: PHARMACY | Facility: CLINIC | Age: 65
End: 2025-03-11
Payer: MEDICARE

## 2025-03-12 ENCOUNTER — APPOINTMENT (OUTPATIENT)
Dept: PHARMACY | Facility: HOSPITAL | Age: 65
End: 2025-03-12
Payer: COMMERCIAL

## 2025-03-17 ENCOUNTER — APPOINTMENT (OUTPATIENT)
Dept: OBSTETRICS AND GYNECOLOGY | Facility: CLINIC | Age: 65
End: 2025-03-17
Payer: COMMERCIAL

## 2025-03-17 ENCOUNTER — HOSPITAL ENCOUNTER (OUTPATIENT)
Dept: RADIOLOGY | Facility: CLINIC | Age: 65
Discharge: HOME | End: 2025-03-17
Payer: COMMERCIAL

## 2025-03-17 VITALS
BODY MASS INDEX: 45.35 KG/M2 | HEIGHT: 56 IN | WEIGHT: 201.6 LBS | DIASTOLIC BLOOD PRESSURE: 78 MMHG | SYSTOLIC BLOOD PRESSURE: 120 MMHG

## 2025-03-17 DIAGNOSIS — Z12.31 ENCOUNTER FOR SCREENING MAMMOGRAM FOR MALIGNANT NEOPLASM OF BREAST: ICD-10-CM

## 2025-03-17 DIAGNOSIS — Z01.419 WELL WOMAN EXAM: Primary | ICD-10-CM

## 2025-03-17 DIAGNOSIS — Z12.4 CERVICAL CANCER SCREENING: ICD-10-CM

## 2025-03-17 DIAGNOSIS — Z12.31 VISIT FOR SCREENING MAMMOGRAM: ICD-10-CM

## 2025-03-17 PROCEDURE — 1036F TOBACCO NON-USER: CPT | Performed by: OBSTETRICS & GYNECOLOGY

## 2025-03-17 PROCEDURE — 3074F SYST BP LT 130 MM HG: CPT | Performed by: OBSTETRICS & GYNECOLOGY

## 2025-03-17 PROCEDURE — 77063 BREAST TOMOSYNTHESIS BI: CPT | Performed by: STUDENT IN AN ORGANIZED HEALTH CARE EDUCATION/TRAINING PROGRAM

## 2025-03-17 PROCEDURE — 77067 SCR MAMMO BI INCL CAD: CPT

## 2025-03-17 PROCEDURE — 3008F BODY MASS INDEX DOCD: CPT | Performed by: OBSTETRICS & GYNECOLOGY

## 2025-03-17 PROCEDURE — 99386 PREV VISIT NEW AGE 40-64: CPT | Performed by: OBSTETRICS & GYNECOLOGY

## 2025-03-17 PROCEDURE — 77067 SCR MAMMO BI INCL CAD: CPT | Performed by: STUDENT IN AN ORGANIZED HEALTH CARE EDUCATION/TRAINING PROGRAM

## 2025-03-17 PROCEDURE — 4010F ACE/ARB THERAPY RXD/TAKEN: CPT | Performed by: OBSTETRICS & GYNECOLOGY

## 2025-03-17 PROCEDURE — 3078F DIAST BP <80 MM HG: CPT | Performed by: OBSTETRICS & GYNECOLOGY

## 2025-03-17 ASSESSMENT — ENCOUNTER SYMPTOMS
MUSCULOSKELETAL NEGATIVE: 1
RESPIRATORY NEGATIVE: 1
PSYCHIATRIC NEGATIVE: 1
CARDIOVASCULAR NEGATIVE: 1
GASTROINTESTINAL NEGATIVE: 1
NEUROLOGICAL NEGATIVE: 1
CONSTITUTIONAL NEGATIVE: 1

## 2025-03-17 NOTE — PROGRESS NOTES
"Subjective   Cora Acosta is a 64 y.o. female who presents for annual exam. The patient has no complaints today. The patient is not sexually active. GYN screening history: last pap: was normal. The patient is not taking hormone replacement therapy. Patient denies post-menopausal vaginal bleeding.. The patient wears seatbelts: yes. The patient participates in regular exercise: yes. Has the patient ever been transfused or tattooed?: not asked. The patient reports that there is not domestic violence in their life.     Menstrual History:  OB History          1    Para   1    Term   1            AB        Living             SAB        IAB        Ectopic        Multiple        Live Births                      No LMP recorded. Patient is postmenopausal.         Review of Systems   Constitutional: Negative.    Respiratory: Negative.     Cardiovascular: Negative.    Gastrointestinal: Negative.    Genitourinary: Negative.    Musculoskeletal: Negative.    Skin: Negative.    Neurological: Negative.    Psychiatric/Behavioral: Negative.          Objective   /78   Ht 1.422 m (4' 8\")   Wt 91.4 kg (201 lb 9.6 oz)   BMI 45.20 kg/m²     General:   alert and oriented, in no acute distress   Heart: regular rate and rhythm, S1, S2 normal, no murmur, click, rub or gallop   Lungs: clear to auscultation bilaterally   Abdomen: soft, non-tender, without masses or organomegaly   Pelvic: Vulva normal in appearance without discoloration, rashes, lesions. Urethral meatus normal in appearance, without masses. Vagina is negative for blood, discharge, lesions. Uterus is small, mobile, non tender. There is no cervical motion tenderness, adnexal masses/tenderness. Perineum and anus with normal architecture and without rashes, lesions, discoloration.     Breast: No masses, skin changes, discoloration, tenderness, or nipple drainage bilaterally     Neck: Normal ROM. Thyroid normal size. No masses/tenderness     Lymph: No LAD "   Psych: Normal mood/affect     Lab Review      Assessment/Plan   Problem List Items Addressed This Visit    None  Visit Diagnoses         Codes    Well woman exam    -  Primary Z01.419    Cervical cancer screening     Z12.4    Relevant Orders    THINPREP PAP    Visit for screening mammogram     Z12.31          1. Pelvic and breast exam wnl  2. Rec for mammogram given, counseled in breast awareness/self exam  3. Pap/cotesting pending   4. Cologuard up to date  5. Patient asymptomatic without PMB. No HRT/ Osphena. Dexa 65    RTO 1 year  Zee Maddox, DO

## 2025-03-17 NOTE — PATIENT INSTRUCTIONS
Routine Gynecologic Visit:  You were seen today for a routine gyn visit with normal findings on exam  You were due for a pap smear today. You should still continue to get annual breast and pelvic exams in the office.  Your mammogram results are still in process, you will be notified when they are back. We will repeat your mammogram in 1 year  If you are having any gynecologic issues in the next year you should call the office. [phone] ([place]) or [phone] ([place])

## 2025-03-31 ENCOUNTER — APPOINTMENT (OUTPATIENT)
Dept: PRIMARY CARE | Facility: CLINIC | Age: 65
End: 2025-03-31
Payer: COMMERCIAL

## 2025-03-31 VITALS
SYSTOLIC BLOOD PRESSURE: 120 MMHG | HEART RATE: 64 BPM | TEMPERATURE: 97.5 F | WEIGHT: 195 LBS | DIASTOLIC BLOOD PRESSURE: 80 MMHG | BODY MASS INDEX: 43.72 KG/M2

## 2025-03-31 DIAGNOSIS — E78.2 MIXED HYPERLIPIDEMIA: ICD-10-CM

## 2025-03-31 DIAGNOSIS — E66.01 CLASS 3 SEVERE OBESITY DUE TO EXCESS CALORIES WITH SERIOUS COMORBIDITY AND BODY MASS INDEX (BMI) OF 40.0 TO 44.9 IN ADULT: ICD-10-CM

## 2025-03-31 DIAGNOSIS — E55.9 VITAMIN D DEFICIENCY: ICD-10-CM

## 2025-03-31 DIAGNOSIS — E11.69 TYPE 2 DIABETES MELLITUS WITH OTHER SPECIFIED COMPLICATION, WITHOUT LONG-TERM CURRENT USE OF INSULIN: ICD-10-CM

## 2025-03-31 DIAGNOSIS — E11.618 TYPE 2 DIABETES MELLITUS WITH OTHER DIABETIC ARTHROPATHY, WITHOUT LONG-TERM CURRENT USE OF INSULIN: Primary | ICD-10-CM

## 2025-03-31 DIAGNOSIS — E66.813 CLASS 3 SEVERE OBESITY DUE TO EXCESS CALORIES WITH SERIOUS COMORBIDITY AND BODY MASS INDEX (BMI) OF 40.0 TO 44.9 IN ADULT: ICD-10-CM

## 2025-03-31 DIAGNOSIS — E06.3 HYPOTHYROIDISM DUE TO HASHIMOTO'S THYROIDITIS: ICD-10-CM

## 2025-03-31 PROCEDURE — 4010F ACE/ARB THERAPY RXD/TAKEN: CPT | Performed by: NURSE PRACTITIONER

## 2025-03-31 PROCEDURE — 99213 OFFICE O/P EST LOW 20 MIN: CPT | Performed by: NURSE PRACTITIONER

## 2025-03-31 PROCEDURE — 3079F DIAST BP 80-89 MM HG: CPT | Performed by: NURSE PRACTITIONER

## 2025-03-31 PROCEDURE — 1036F TOBACCO NON-USER: CPT | Performed by: NURSE PRACTITIONER

## 2025-03-31 PROCEDURE — 3074F SYST BP LT 130 MM HG: CPT | Performed by: NURSE PRACTITIONER

## 2025-03-31 RX ORDER — ACETAMINOPHEN 500 MG
5000 TABLET ORAL DAILY
Qty: 90 TABLET | Refills: 3 | Status: SHIPPED | OUTPATIENT
Start: 2025-03-31 | End: 2026-03-26

## 2025-03-31 RX ORDER — DAPAGLIFLOZIN 10 MG/1
10 TABLET, FILM COATED ORAL DAILY
Qty: 90 TABLET | Refills: 3 | Status: SHIPPED | OUTPATIENT
Start: 2025-03-31 | End: 2026-03-26

## 2025-03-31 RX ORDER — ATORVASTATIN CALCIUM 80 MG/1
80 TABLET, FILM COATED ORAL NIGHTLY
Qty: 90 TABLET | Refills: 3 | Status: SHIPPED | OUTPATIENT
Start: 2025-03-31 | End: 2026-03-26

## 2025-03-31 RX ORDER — LEVOTHYROXINE SODIUM 125 UG/1
125 TABLET ORAL DAILY
Qty: 90 TABLET | Refills: 3 | Status: SHIPPED | OUTPATIENT
Start: 2025-03-31 | End: 2026-03-26

## 2025-03-31 NOTE — ASSESSMENT & PLAN NOTE
& Hyperlipidemia    Orders:    dapagliflozin propanediol (Farxiga) 10 mg tablet; Take 1 tablet (10 mg) by mouth once daily.    atorvastatin (Lipitor) 80 mg tablet; Take 1 tablet (80 mg) by mouth once daily at bedtime.  Check fasting lipids in 3 months from starting atorvastatin.

## 2025-03-31 NOTE — ASSESSMENT & PLAN NOTE
Orders:    cholecalciferol (Vitamin D3) 5,000 Units tablet; Take 1 tablet (5,000 Units) by mouth once daily.  Check vitamin D level in 3 months.

## 2025-03-31 NOTE — PROGRESS NOTES
Subjective   Patient ID: Cora Acosta is a 64 y.o. female who presents for Follow-up.    HPI    Restarted medication February 28, 2025 last office visit.  Feels much better today as compared to last office visit.      Restarted semaglutide 0.25 mg once weekly.  Making better food choices since her hunger is controlled.  Manages nausea with Zofran and constipation with over-the-counter laxatives.  Does not feel like she has lost weight.     Primary hypertension and stopped daily Cozaar last month.  Denies any chest pain, palpitations, shortness of breath with exertion or swelling in legs.     Restarted Lipitor.  Denies negative side effects from medications.  Making better food choices at his her son cooks for her and her .  Family history of heart disease.     Reports asthma/reactive airway.  Has not had asthma attack last 6 months.  Does have congestion, runny nose and sometimes cough during spring months, winter months and with colds.  Denies wheezing and chest tightness today.  Noted there is no PFTs in chart.  Has not used albuterol/budesonide since filled November 2024.     Restarted levothyroxine 125 mcg daily.  Reports not as fatigued since starting medication.  Feels much better with more energy.     Restarted Lexapro 10 mg tablet x 2 daily.  This has helped improve her mood.  Able to manage emotions as she does not feel overwhelmed or hopeless or sadness daily.       Is with low back pain.  Does take naproxen as needed once daily to help reduce pain and improve function.  Back pain increases with standing and laying flat on her back.  Denies any leg pain, numbness weakness or tingling.  History of lumbar epidural steroid injection therapy.          Medication Documentation Review Audit       Reviewed by SYEDA Voss (Nurse Practitioner) on 03/31/25 at 0747      Medication Order Taking? Sig Documenting Provider Last Dose Status   albuterol-budesonide (Airsupra) 90-80 mcg/actuation inhaler  [id number]  Inhale 2 puffs every 6 hours if needed (prn). SYEDA Patel  Active   atorvastatin (Lipitor) 80 mg tablet [id number]  TAKE ONE TABLET BY MOUTH AT BEDTIME Gayathri Davidson DO  Active   cholecalciferol (Vitamin D3) 5,000 Units tablet [id number]  Take 1 tablet (5,000 Units) by mouth once daily. Historical Provider, MD  Active   cyclobenzaprine (Flexeril) 10 mg tablet [id number]  Take 1 tablet (10 mg) by mouth as needed at bedtime for muscle spasms. SYEDA Patel  Active   dapagliflozin propanediol (Farxiga) 10 mg [id number]  Take 1 tablet (10 mg) by mouth once daily. SYEDA Patel  Active   escitalopram (Lexapro) 10 mg tablet [id number]  Take 2 tablets (20 mg) by mouth once daily. Brittany Behm, DO  Active     Discontinued 03/31/25 0743   losartan (Cozaar) 25 mg tablet [id number]  take 1 tablet (25 mg) by mouth once daily in the morning. Gayathri Davidson DO  Active   melatonin 12 mg tablet [id number]  Take 3 mg by mouth as needed at bedtime. Historical Provider, MD  Active   multivitamin-Ca-iron-minerals (Multiple Vitamin, Womens) tablet [id number]  Take 1 tablet by mouth once daily. Historical Provider, MD  Active   naproxen (Naprosyn) 250 mg tablet [id number]  Take 3 tablets (750 mg) by mouth once daily at bedtime. Historical Provider, MD  Active   omega 3-dha-epa-fish oil (Fish OiL) 360 mg-144 mg- 216 mg-1,200 mg capsule,delayed release(DR/EC) [id number]  Take 2 capsules by mouth once daily. Historical Provider, MD  Active   ondansetron ODT (Zofran-ODT) 8 mg disintegrating tablet [id number]  Take 1 tablet (8 mg) by mouth every 8 hours if needed for nausea or vomiting. SYEDA Patel  Active   semaglutide 0.25 mg or 0.5 mg (2 mg/3 mL) pen injector [id number]  Inject 0.25 mg under the skin 1 (one) time per week for 28 days, THEN 0.5 mg 1 (one) time per week. JC Voss-MARYCARMEN  Active                     Vitals:    03/31/25 0743 03/31/25 0808   BP:  120/80   BP  Location:  Right arm   Patient Position:  Sitting   Pulse:  64  Comment: apical   Temp: 36.4 °C (97.5 °F)    TempSrc: Temporal    Weight: 88.5 kg (195 lb)       Body mass index is 43.72 kg/m².     Review of Systems   All other systems reviewed and are negative.  Except for what is in the history of present illness.    Objective   Physical Exam  Vitals and nursing note reviewed.   Constitutional:       Appearance: Normal appearance.   HENT:      Head: Normocephalic and atraumatic.   Eyes:      Conjunctiva/sclera: Conjunctivae normal.   Cardiovascular:      Rate and Rhythm: Normal rate and regular rhythm.      Pulses: Normal pulses.      Comments: Systolic murmur 1-2 out of 6 left upper sternal border.  Pulmonary:      Effort: Pulmonary effort is normal. No respiratory distress.   Abdominal:      General: Bowel sounds are normal.      Palpations: Abdomen is soft.   Musculoskeletal:      Right lower leg: No edema.      Left lower leg: No edema.   Skin:     General: Skin is warm and dry.      Capillary Refill: Capillary refill takes less than 2 seconds.   Neurological:      Mental Status: She is alert and oriented to person, place, and time.      Cranial Nerves: No cranial nerve deficit.      Motor: No weakness.      Gait: Gait normal.   Psychiatric:         Mood and Affect: Mood normal.         Behavior: Behavior normal.             Assessment/Plan   Assessment & Plan  Type 2 diabetes mellitus with other diabetic arthropathy, without long-term current use of insulin    & BMI 43    Orders:    semaglutide 0.25 mg or 0.5 mg (2 mg/3 mL) pen injector; Inject 0.5 mg under the skin 1 (one) time per week for 28 days, THEN 0.5 mg 1 (one) time per week.    dapagliflozin propanediol (Farxiga) 10 mg tablet; Take 1 tablet (10 mg) by mouth once daily.    Follow Up In Primary Care 3 months.  Ordered hemoglobin A1c and urine albumin to do 3 months from starting the medication.    Hypothyroidism due to Hashimoto's  thyroiditis    Orders:    levothyroxine (Synthroid, Levoxyl) 125 mcg tablet; Take 1 tablet (125 mcg) by mouth early in the morning.. Take on an empty stomach at the same time each day, either 30 to 60 minutes prior to breakfast.  Takes once daily.  This is different than taking Monday through Friday once daily and to Saturday and Sunday.  Continue with once daily and recheck TSH in 3 months.    Type 2 diabetes mellitus with other specified complication, without long-term current use of insulin    & Hyperlipidemia    Orders:    dapagliflozin propanediol (Farxiga) 10 mg tablet; Take 1 tablet (10 mg) by mouth once daily.    atorvastatin (Lipitor) 80 mg tablet; Take 1 tablet (80 mg) by mouth once daily at bedtime.  Check fasting lipids in 3 months from starting atorvastatin.    Vitamin D deficiency    Orders:    cholecalciferol (Vitamin D3) 5,000 Units tablet; Take 1 tablet (5,000 Units) by mouth once daily.  Check vitamin D level in 3 months.             SYEDA Voss 03/31/25 8:11 AM

## 2025-03-31 NOTE — ASSESSMENT & PLAN NOTE
& BMI 43    Orders:    semaglutide 0.25 mg or 0.5 mg (2 mg/3 mL) pen injector; Inject 0.5 mg under the skin 1 (one) time per week for 28 days, THEN 0.5 mg 1 (one) time per week.    dapagliflozin propanediol (Farxiga) 10 mg tablet; Take 1 tablet (10 mg) by mouth once daily.    Follow Up In Primary Care 3 months.  Ordered hemoglobin A1c and urine albumin to do 3 months from starting the medication.

## 2025-04-02 ENCOUNTER — APPOINTMENT (OUTPATIENT)
Dept: PHARMACY | Facility: HOSPITAL | Age: 65
End: 2025-04-02
Payer: COMMERCIAL

## 2025-04-02 DIAGNOSIS — E11.618 TYPE 2 DIABETES MELLITUS WITH OTHER DIABETIC ARTHROPATHY, WITHOUT LONG-TERM CURRENT USE OF INSULIN: ICD-10-CM

## 2025-04-02 PROCEDURE — RXMED WILLOW AMBULATORY MEDICATION CHARGE

## 2025-04-02 NOTE — ASSESSMENT & PLAN NOTE
Patient's goal A1c is < 7%.  Is pt at goal? No, 8.4%  Patient's SMBGs are unavailable- not currently testing.     Rationale for plan: Patient has not increased to Ozempic 0.5 mg -  Pharmacy not able to order. Will fill and mail via  [facility].    Medication Changes:  CONTINUE  Farxiga 10 mg - 1 tablet daily   INCREASE  Ozempic 0.5 mg/dose - once weekly    Future Considerations:  Further Ozempic titration as tolerated/needed based on home BG    Monitoring and Education:  Patient to start testing FBG daily

## 2025-04-02 NOTE — PROGRESS NOTES
"  Clinical Pharmacy Appointment  Subjective     Patient ID: Cora Acosta is a 64 y.o. female who presents for Diabetes.    Referring Provider: Monika Julien APRN-C*     DIABETES MELLITUS TYPE 2:    Does patient follow with Endocrinology: No  Last optometry exam: June 2024    Diet: \"much better\" - her son has her on a mediterranean diet     Exercise: Walking sometimes (even though it's hard on her knees, but does not cause additional pain in knees)    Allergies   Allergen Reactions    Erythromycin GI Upset    Animal Dander Unknown    Codeine Nausea Only    Meperidine Nausea Only    Mold Unknown    Pollen Extracts Unknown       Objective     Current DM Pharmacotherapy:   Ozempic 0.5 mg/dose - once weekly   Farxiga 10 mg - 1 tablet daily     Clarifications to above regimen: Has not yet started 0.5 mg dose - still taking 0.25 mg   Adverse Effects: None recently     SECONDARY PREVENTION  - Statin? Yes, Atorvastatin 80 mg  - ACEi/ARB? Yes, Losartan 25 mg  - Aspirin? No    Current monitoring regimen:   Patient is using: glucometer    Testing frequency: not currently testing     SMBG Readings: noen available     Any episodes of hypoglycemia? No.  Did patient treat episode of hypoglycemia appropriately? N/A    Pertinent PMH Review:  - PMH of Pancreatitis: No  - PMH/FH of Medullary Thyroid Cancer: No  - PMH/FH of Multiple Endocrine Neoplasia (MEN) Type II: No  - PMH of Retinopathy: No  - PMH of Urinary Tract Infections/Yeast Infections: No    Immunizations:  Influenza? No  COVID? No  Pneumonia? Yes  Shingles? No  Hepatitis B? Yes  RSV? No     Lab Review  BMP  Lab Results   Component Value Date    CALCIUM 9.1 02/27/2025     02/27/2025    K 4.2 02/27/2025    CO2 29 02/27/2025     02/27/2025    BUN 16 02/27/2025    CREATININE 0.68 02/27/2025    EGFR 97 02/27/2025     LFTs  Lab Results   Component Value Date    ALT 30 (H) 02/27/2025    AST 20 02/27/2025    ALKPHOS 75 02/27/2025    BILITOT 0.4 02/27/2025 "     FLP  Lab Results   Component Value Date    TRIG 149 02/27/2025    CHOL 347 (H) 02/27/2025    LDLCALC 261 (H) 02/27/2025    HDL 55 02/27/2025       The ASCVD Risk score (Sven WESLEY, et al., 2019) failed to calculate for the following reasons:    The valid total cholesterol range is 130 to 320 mg/dL  Urine Microalbumin  Lab Results   Component Value Date    MICROALBCREA  07/25/2024      Comment:      One or more analytes used in this calculation is outside of the analytical measurement range. Calculation cannot be performed.     Albumin/Creatinine Ratio   Date Value Ref Range Status   07/25/2024   Final     Comment:     One or more analytes used in this calculation is outside of the analytical measurement range. Calculation cannot be performed.     Albumin/Creatine Ratio   Date Value Ref Range Status   12/15/2020 17.5 0.0 - 30.0 ug/mg crt Final     Weight Management  Wt Readings from Last 3 Encounters:   03/31/25 88.5 kg (195 lb)   03/17/25 91.4 kg (201 lb 9.6 oz)   02/27/25 92.3 kg (203 lb 6.4 oz)      There is no height or weight on file to calculate BMI.   A1C  Lab Results   Component Value Date    HGBA1C 8.4 (H) 02/27/2025    HGBA1C 7.2 (H) 07/25/2024    HGBA1C 11.9 (A) 01/19/2024         Assessment/Plan     Problem List Items Addressed This Visit       Diabetes mellitus, type 2 (Multi)     Patient's goal A1c is < 7%.  Is pt at goal? No, 8.4%  Patient's SMBGs are unavailable- not currently testing.     Rationale for plan: Patient has not increased to Ozempic 0.5 mg -  Pharmacy not able to order. Will fill and mail via  [facility].    Medication Changes:  CONTINUE  Farxiga 10 mg - 1 tablet daily   INCREASE  Ozempic 0.5 mg/dose - once weekly    Future Considerations:  Further Ozempic titration as tolerated/needed based on home BG    Monitoring and Education:  Patient to start testing FBG daily               Follow up: I recommend diabetes care be 3 weeks.    Olesya Gomez PharmD, BCACP  Clinical Pharmacy  Specialist, Primary Care     Continue all meds under the continuation of care with the referring provider and clinical pharmacy team

## 2025-04-14 ENCOUNTER — PHARMACY VISIT (OUTPATIENT)
Dept: PHARMACY | Facility: CLINIC | Age: 65
End: 2025-04-14
Payer: MEDICARE

## 2025-04-23 ENCOUNTER — APPOINTMENT (OUTPATIENT)
Dept: PHARMACY | Facility: HOSPITAL | Age: 65
End: 2025-04-23
Payer: COMMERCIAL

## 2025-04-23 DIAGNOSIS — E11.618 TYPE 2 DIABETES MELLITUS WITH OTHER DIABETIC ARTHROPATHY, WITHOUT LONG-TERM CURRENT USE OF INSULIN: ICD-10-CM

## 2025-04-23 NOTE — ASSESSMENT & PLAN NOTE
Patient's goal A1c is < 7%.  Is pt at goal? No, 8.4%  Patient's SMBGs are well controlled from patient memory.     Rationale for plan: Doing well, would like further weight loss, BG well controlled, no ADEs.    Medication Changes:  CONTINUE  Farxiga 10 mg - 1 tablet daily   INCREASE  Ozempic 1 mg/dose- once weekly     Future Considerations:  Further Ozempic titration if needed     Monitoring and Education:  Continue home BG monitoring

## 2025-04-23 NOTE — PROGRESS NOTES
"  Clinical Pharmacy Appointment  Subjective     Patient ID: Cora Acosta is a 64 y.o. female who presents for Diabetes.    Referring Provider: Monika Julien APRN-C*     DIABETES MELLITUS TYPE 2:    Does patient follow with Endocrinology: No  Last optometry exam: June 2024    Diet: \"much better\" - her son has her on a mediterranean diet     Exercise: Walking sometimes (even though it's hard on her knees, but does not cause additional pain in knees)    Allergies   Allergen Reactions    Erythromycin GI Upset    Animal Dander Unknown    Codeine Nausea Only    Meperidine Nausea Only    Mold Unknown    Pollen Extracts Unknown       Objective     Current DM Pharmacotherapy:   Ozempic 0.5 mg/dose - once weekly   Farxiga 10 mg - 1 tablet daily     Clarifications to above regimen: Has taken 3 doses of 0.5 mg   Adverse Effects: None recently     SECONDARY PREVENTION  - Statin? Yes, Atorvastatin 80 mg  - ACEi/ARB? Yes, Losartan 25 mg  - Aspirin? No    Current monitoring regimen:   Patient is using: glucometer    Testing frequency: last checked a week ago FBG     SMBG Readings: Reports FBG was in 80s and PPG was 126 mg/dL     Any episodes of hypoglycemia? No.  Did patient treat episode of hypoglycemia appropriately? N/A    Pertinent PMH Review:  - PMH of Pancreatitis: No  - PMH/FH of Medullary Thyroid Cancer: No  - PMH/FH of Multiple Endocrine Neoplasia (MEN) Type II: No  - PMH of Retinopathy: No  - PMH of Urinary Tract Infections/Yeast Infections: No    Immunizations:  Influenza? No  COVID? No  Pneumonia? Yes  Shingles? No  Hepatitis B? Yes  RSV? No     Lab Review  BMP  Lab Results   Component Value Date    CALCIUM 9.1 02/27/2025     02/27/2025    K 4.2 02/27/2025    CO2 29 02/27/2025     02/27/2025    BUN 16 02/27/2025    CREATININE 0.68 02/27/2025    EGFR 97 02/27/2025     LFTs  Lab Results   Component Value Date    ALT 30 (H) 02/27/2025    AST 20 02/27/2025    ALKPHOS 75 02/27/2025    BILITOT 0.4 02/27/2025 "     FLP  Lab Results   Component Value Date    TRIG 149 02/27/2025    CHOL 347 (H) 02/27/2025    LDLCALC 261 (H) 02/27/2025    HDL 55 02/27/2025       The ASCVD Risk score (Sven WESLEY, et al., 2019) failed to calculate for the following reasons:    The valid total cholesterol range is 130 to 320 mg/dL  Urine Microalbumin  Lab Results   Component Value Date    MICROALBCREA  07/25/2024      Comment:      One or more analytes used in this calculation is outside of the analytical measurement range. Calculation cannot be performed.     Albumin/Creatinine Ratio   Date Value Ref Range Status   07/25/2024   Final     Comment:     One or more analytes used in this calculation is outside of the analytical measurement range. Calculation cannot be performed.     Albumin/Creatine Ratio   Date Value Ref Range Status   12/15/2020 17.5 0.0 - 30.0 ug/mg crt Final     Weight Management  Wt Readings from Last 3 Encounters:   03/31/25 88.5 kg (195 lb)   03/17/25 91.4 kg (201 lb 9.6 oz)   02/27/25 92.3 kg (203 lb 6.4 oz)      193.9 lbs - 4/23/25 pt reported     There is no height or weight on file to calculate BMI.   A1C  Lab Results   Component Value Date    HGBA1C 8.4 (H) 02/27/2025    HGBA1C 7.2 (H) 07/25/2024    HGBA1C 11.9 (A) 01/19/2024       Assessment/Plan     Problem List Items Addressed This Visit       Diabetes mellitus, type 2 (Multi)    Patient's goal A1c is < 7%.  Is pt at goal? No, 8.4%  Patient's SMBGs are well controlled from patient memory.     Rationale for plan: Doing well, would like further weight loss, BG well controlled, no ADEs.    Medication Changes:  CONTINUE  Farxiga 10 mg - 1 tablet daily   INCREASE  Ozempic 1 mg/dose- once weekly     Future Considerations:  Further Ozempic titration if needed     Monitoring and Education:  Continue home BG monitoring               Follow up: I recommend diabetes care be 4 weeks.    Olesya Gomez PharmD, BCACP  Clinical Pharmacy Specialist, Primary Care     Continue all meds  under the continuation of care with the referring provider and clinical pharmacy team

## 2025-05-09 DIAGNOSIS — R19.5 POSITIVE COLORECTAL CANCER SCREENING USING COLOGUARD TEST: Primary | ICD-10-CM

## 2025-05-09 LAB — NONINV COLON CA DNA+OCC BLD SCRN STL QL: POSITIVE

## 2025-05-21 ENCOUNTER — APPOINTMENT (OUTPATIENT)
Dept: PHARMACY | Facility: HOSPITAL | Age: 65
End: 2025-05-21
Payer: COMMERCIAL

## 2025-05-21 DIAGNOSIS — E11.618 TYPE 2 DIABETES MELLITUS WITH OTHER DIABETIC ARTHROPATHY, WITHOUT LONG-TERM CURRENT USE OF INSULIN: ICD-10-CM

## 2025-05-21 NOTE — ASSESSMENT & PLAN NOTE
Patient's goal A1c is < 7%.  Is pt at goal? No, 8.4%  Patient's SMBGs are N/A   Rationale for plan: Doing well, would like further weight loss, BG well controlled, never received 1 mg dose from pharmacy, has been taking 0.5 mg of Ozempic. Still interested in increase, will call pharmacy to have it filled.      Medication Changes:  CONTINUE  Farxiga 10 mg - 1 tablet daily   INCREASE  Ozempic 1 mg/dose- once weekly      Future Considerations:  Further Ozempic titration if needed      Monitoring and Education:  Encouraged to check FBG at least 1-3 times per week

## 2025-05-21 NOTE — PROGRESS NOTES
"  Clinical Pharmacy Appointment  Subjective     Patient ID: Cora Acosta is a 64 y.o. female who presents for Diabetes.    Referring Provider: Monika Julien APRN-C*     DIABETES MELLITUS TYPE 2:    Does patient follow with Endocrinology: No  Last optometry exam: June 2024    Diet: \"much better\" - her son has her on a mediterranean diet     Exercise: Walking large property  (even though it's hard on her knees, but does not cause additional pain in knees), gardening     Allergies   Allergen Reactions    Erythromycin GI Upset    Animal Dander Unknown    Codeine Nausea Only    Meperidine Nausea Only    Mold Unknown    Pollen Extracts Unknown       Objective     Current DM Pharmacotherapy:   Ozempic 0.5 mg/dose - once weekly   Farxiga 10 mg - 1 tablet daily     Clarifications to above regimen: None  Adverse Effects: None recently     SECONDARY PREVENTION  - Statin? Yes, Atorvastatin 80 mg  - ACEi/ARB? Yes, Losartan 25 mg  - Aspirin? No    Current monitoring regimen:   Patient is using: glucometer    Testing frequency: has not tested in weeks    SMBG Readings: None available     Any episodes of hypoglycemia? No.  Did patient treat episode of hypoglycemia appropriately? N/A    Pertinent PMH Review:  - PMH of Pancreatitis: No  - PMH/FH of Medullary Thyroid Cancer: No  - PMH/FH of Multiple Endocrine Neoplasia (MEN) Type II: No  - PMH of Retinopathy: No  - PMH of Urinary Tract Infections/Yeast Infections: No    Immunizations:  Influenza? No  COVID? No  Pneumonia? Yes  Shingles? No  Hepatitis B? Yes  RSV? No     Lab Review  BMP  Lab Results   Component Value Date    CALCIUM 9.1 02/27/2025     02/27/2025    K 4.2 02/27/2025    CO2 29 02/27/2025     02/27/2025    BUN 16 02/27/2025    CREATININE 0.68 02/27/2025    EGFR 97 02/27/2025     LFTs  Lab Results   Component Value Date    ALT 30 (H) 02/27/2025    AST 20 02/27/2025    ALKPHOS 75 02/27/2025    BILITOT 0.4 02/27/2025     FLP  Lab Results   Component Value " Date    TRIG 149 02/27/2025    CHOL 347 (H) 02/27/2025    LDLCALC 261 (H) 02/27/2025    HDL 55 02/27/2025       The ASCVD Risk score (Sven WESLEY, et al., 2019) failed to calculate for the following reasons:    The valid total cholesterol range is 130 to 320 mg/dL  Urine Microalbumin  Lab Results   Component Value Date    MICROALBCREA  07/25/2024      Comment:      One or more analytes used in this calculation is outside of the analytical measurement range. Calculation cannot be performed.     Albumin/Creatinine Ratio   Date Value Ref Range Status   07/25/2024   Final     Comment:     One or more analytes used in this calculation is outside of the analytical measurement range. Calculation cannot be performed.     Albumin/Creatine Ratio   Date Value Ref Range Status   12/15/2020 17.5 0.0 - 30.0 ug/mg crt Final     Weight Management  Wt Readings from Last 3 Encounters:   03/31/25 88.5 kg (195 lb)   03/17/25 91.4 kg (201 lb 9.6 oz)   02/27/25 92.3 kg (203 lb 6.4 oz)      193.9 lbs - 4/23/25 pt reported     There is no height or weight on file to calculate BMI.   A1C  Lab Results   Component Value Date    HGBA1C 8.4 (H) 02/27/2025    HGBA1C 7.2 (H) 07/25/2024    HGBA1C 11.9 (A) 01/19/2024       Assessment/Plan     Problem List Items Addressed This Visit       Diabetes mellitus, type 2 (Multi)    Patient's goal A1c is < 7%.  Is pt at goal? No, 8.4%  Patient's SMBGs are N/A   Rationale for plan: Doing well, would like further weight loss, BG well controlled, never received 1 mg dose from pharmacy, has been taking 0.5 mg of Ozempic. Still interested in increase, will call pharmacy to have it filled.      Medication Changes:  CONTINUE  Farxiga 10 mg - 1 tablet daily   INCREASE  Ozempic 1 mg/dose- once weekly      Future Considerations:  Further Ozempic titration if needed      Monitoring and Education:  Encouraged to check FBG at least 1-3 times per week         Relevant Orders    Referral to Clinical Pharmacy     Follow up:  I recommend diabetes care be 8 weeks.  Reports good adherence to all prescribed medications    Olesya Gomez PharmD, BCACP  Clinical Pharmacy Specialist, Primary Care     Continue all meds under the continuation of care with the referring provider and clinical pharmacy team

## 2025-05-27 ENCOUNTER — PHARMACY VISIT (OUTPATIENT)
Dept: PHARMACY | Facility: CLINIC | Age: 65
End: 2025-05-27
Payer: MEDICARE

## 2025-05-27 PROCEDURE — RXMED WILLOW AMBULATORY MEDICATION CHARGE

## 2025-05-28 DIAGNOSIS — E03.8 OTHER SPECIFIED HYPOTHYROIDISM: ICD-10-CM

## 2025-05-28 DIAGNOSIS — E11.00 TYPE 2 DIABETES MELLITUS WITH HYPEROSMOLARITY WITHOUT COMA, WITHOUT LONG-TERM CURRENT USE OF INSULIN (MULTI): ICD-10-CM

## 2025-05-28 DIAGNOSIS — I10 PRIMARY HYPERTENSION: ICD-10-CM

## 2025-05-28 DIAGNOSIS — E66.813 CLASS 3 SEVERE OBESITY DUE TO EXCESS CALORIES WITH SERIOUS COMORBIDITY AND BODY MASS INDEX (BMI) OF 40.0 TO 44.9 IN ADULT: ICD-10-CM

## 2025-05-28 DIAGNOSIS — E78.2 MIXED HYPERLIPIDEMIA: ICD-10-CM

## 2025-06-24 PROCEDURE — RXMED WILLOW AMBULATORY MEDICATION CHARGE

## 2025-06-25 ENCOUNTER — PHARMACY VISIT (OUTPATIENT)
Dept: PHARMACY | Facility: CLINIC | Age: 65
End: 2025-06-25
Payer: MEDICARE

## 2025-06-30 ENCOUNTER — APPOINTMENT (OUTPATIENT)
Dept: PRIMARY CARE | Facility: CLINIC | Age: 65
End: 2025-06-30
Payer: COMMERCIAL

## 2025-06-30 VITALS
WEIGHT: 186 LBS | HEIGHT: 56 IN | OXYGEN SATURATION: 97 % | DIASTOLIC BLOOD PRESSURE: 76 MMHG | HEART RATE: 75 BPM | RESPIRATION RATE: 20 BRPM | BODY MASS INDEX: 41.84 KG/M2 | TEMPERATURE: 97.7 F | SYSTOLIC BLOOD PRESSURE: 122 MMHG

## 2025-06-30 DIAGNOSIS — I10 PRIMARY HYPERTENSION: ICD-10-CM

## 2025-06-30 DIAGNOSIS — E11.618 TYPE 2 DIABETES MELLITUS WITH OTHER DIABETIC ARTHROPATHY, WITHOUT LONG-TERM CURRENT USE OF INSULIN: Primary | ICD-10-CM

## 2025-06-30 DIAGNOSIS — E78.2 MIXED HYPERLIPIDEMIA: ICD-10-CM

## 2025-06-30 DIAGNOSIS — E03.9 ACQUIRED HYPOTHYROIDISM: ICD-10-CM

## 2025-06-30 PROCEDURE — 3008F BODY MASS INDEX DOCD: CPT | Performed by: NURSE PRACTITIONER

## 2025-06-30 PROCEDURE — 4010F ACE/ARB THERAPY RXD/TAKEN: CPT | Performed by: NURSE PRACTITIONER

## 2025-06-30 PROCEDURE — 3078F DIAST BP <80 MM HG: CPT | Performed by: NURSE PRACTITIONER

## 2025-06-30 PROCEDURE — 1036F TOBACCO NON-USER: CPT | Performed by: NURSE PRACTITIONER

## 2025-06-30 PROCEDURE — 99214 OFFICE O/P EST MOD 30 MIN: CPT | Performed by: NURSE PRACTITIONER

## 2025-06-30 PROCEDURE — 3074F SYST BP LT 130 MM HG: CPT | Performed by: NURSE PRACTITIONER

## 2025-06-30 NOTE — ASSESSMENT & PLAN NOTE
Continue with Farxiga and Ozempic.  Continue to make dietary changes omitting high carbohydrate and processed foods.  Hemoglobin A1c within the next 6 weeks.  Orders:    Follow Up In Primary Care in 3 months

## 2025-06-30 NOTE — PROGRESS NOTES
Subjective   Cora Acosta is a 64 y.o. female who presents for Follow-up.  HPI  This is a diabetes follow up visit for Cora Acosta.  Current treatment of Farxiga 10 mg daily and Ozempic increased to 1 mg once weekly.  Does not take home blood sugars.  Denies any excessive urination, excessive thirst and excessive hunger.  Does not exercise.  Not much diet change.  But makes conscious effort to try.      Currently taking losartan 25 mg daily.  Is without negative side effects of Cozaar.  Not checking blood pressure home readings.  Denies chest pain, palpitations, shortness of breath, headaches or epistaxis.    Admits to not taking the atorvastatin.  Not sure why this was discontinued.  Is without negative side effects from atorvastatin.    Last Flu Vaccine given:            10/03/2019   Last PCV Vaccine given:   12/17/2014    Lab Results   Component Value Date    HGBA1C 8.4 (H) 02/27/2025    CREATININE 0.68 02/27/2025    MICROALBCREA  07/25/2024      Comment:      One or more analytes used in this calculation is outside of the analytical measurement range. Calculation cannot be performed.    LDLCALC 261 (H) 02/27/2025          Liver Screening: FIB-4 Calculation: 0.9 at 2/27/2025  9:20 AM  Calculated from:  SGOT/AST: 20 U/L at 2/27/2025  9:20 AM  SGPT/ALT: 30 U/L at 2/27/2025  9:20 AM  Platelets: 259 Thousand/uL at 2/27/2025  9:20 AM  Age: 64 years    Interpretation: <1.45 Cirrhosis less likely, 1.45 - 3.25 Indeterminate, >3.25 Cirrhosis more likely    Review of Systems   All other systems reviewed and are negative.  And what is the history of present illness.    Visit Vitals  /76   Pulse 75   Temp 36.5 °C (97.7 °F) (Temporal)   Resp 20   Body mass index is 41.7 kg/m².   Objective   Physical Exam  Vitals and nursing note reviewed.   Constitutional:       Appearance: Normal appearance.   HENT:      Head: Normocephalic.   Eyes:      Conjunctiva/sclera: Conjunctivae normal.   Cardiovascular:      Rate and  Rhythm: Normal rate and regular rhythm.      Pulses: Normal pulses.   Pulmonary:      Effort: Pulmonary effort is normal. No respiratory distress.      Breath sounds: Normal breath sounds.   Abdominal:      General: Bowel sounds are normal.      Palpations: Abdomen is soft.   Musculoskeletal:      Right lower leg: No edema.      Left lower leg: No edema.   Skin:     General: Skin is warm.      Capillary Refill: Capillary refill takes less than 2 seconds.   Neurological:      Mental Status: She is alert and oriented to person, place, and time.   Psychiatric:         Mood and Affect: Mood normal.         Behavior: Behavior normal.             Assessment & Plan  Type 2 diabetes mellitus with other diabetic arthropathy, without long-term current use of insulin    Continue with Farxiga and Ozempic.  Continue to make dietary changes omitting high carbohydrate and processed foods.  Hemoglobin A1c within the next 6 weeks.  Orders:    Follow Up In Primary Care in 3 months    Mixed hyperlipidemia  Restart your atorvastatin every evening and fasting cholesterol within 6 weeks of restarting.  Continue to omit fatty saturated foods from diet.  Increase diet and protein and vegetables.  Fasting lipids within the next 6 weeks.       Acquired hypothyroidism  Continue with your levothyroxine 125 mcg daily.  TSH within the next 6 weeks.       Primary hypertension  Blood pressure well-controlled today is 122/76 with Cozaar 25 mg daily.  CMP within 6 weeks.                 JC Voss-CNP 06/30/25 8:43 AM

## 2025-06-30 NOTE — ASSESSMENT & PLAN NOTE
Restart your atorvastatin every evening and fasting cholesterol within 6 weeks of restarting.  Continue to omit fatty saturated foods from diet.  Increase diet and protein and vegetables.  Fasting lipids within the next 6 weeks.

## 2025-07-17 PROCEDURE — RXMED WILLOW AMBULATORY MEDICATION CHARGE

## 2025-07-19 ENCOUNTER — PHARMACY VISIT (OUTPATIENT)
Dept: PHARMACY | Facility: CLINIC | Age: 65
End: 2025-07-19
Payer: MEDICARE

## 2025-07-23 ENCOUNTER — APPOINTMENT (OUTPATIENT)
Dept: PHARMACY | Facility: HOSPITAL | Age: 65
End: 2025-07-23
Payer: COMMERCIAL

## 2025-07-23 DIAGNOSIS — E11.618 TYPE 2 DIABETES MELLITUS WITH OTHER DIABETIC ARTHROPATHY, WITHOUT LONG-TERM CURRENT USE OF INSULIN: Primary | ICD-10-CM

## 2025-07-23 NOTE — ASSESSMENT & PLAN NOTE
Patient's goal A1c is < 7%.  Is pt at goal? No, 8.4%  Patient's SMBGs are very well controlled when checked.     Rationale for plan: Patient would like to optimize weight loss benefit of Ozempic, no ADEs at this time.    Medication Changes:  CONTINUE  Farxiga 10 mg - 1 tablet daily   INCREASE  Ozempic 2 mg/dose - once weekly   Patient to increase when finished with supply of 1 mg dose at home (~3-4 more doses)    Monitoring and Education:  Patient to get updated labs prior to next PCP visit   Advised patient to test BG if she experiences any s/sx of hypoglycemia

## 2025-07-23 NOTE — PROGRESS NOTES
"  Clinical Pharmacy Appointment  Subjective     Patient ID: Cora Acosta is a 64 y.o. female who presents for Diabetes.    Referring Provider: Monika Julien APRN-C*   Last visit: 6/30/25  Next visit: 8/27/25    DIABETES MELLITUS TYPE 2:    Does patient follow with Endocrinology: No  Last optometry exam: June 2024    Diet: \"much better\" - her son has her on a mediterranean diet     Exercise: Walking large property  (even though it's hard on her knees, but does not cause additional pain in knees), gardening     Allergies   Allergen Reactions    Erythromycin GI Upset    Animal Dander Unknown    Codeine Nausea Only    Meperidine Nausea Only    Mold Unknown    Pollen Extracts Unknown       Objective     Current DM Pharmacotherapy:   Ozempic 1 mg/dose - once weekly   Farxiga 10 mg - 1 tablet daily     Clarifications to above regimen: None  Adverse Effects: None recently     SECONDARY PREVENTION  - Statin? Yes, Atorvastatin 80 mg  - ACEi/ARB? Yes, Losartan 25 mg  - Aspirin? No    Current monitoring regimen:   Patient is using: glucometer    Testing frequency: checks intermittently     SMBG Readings: 115 mg/dL FBG    Any episodes of hypoglycemia? No.  Did patient treat episode of hypoglycemia appropriately? N/A    Pertinent PMH Review:  - PMH of Pancreatitis: No  - PMH/FH of Medullary Thyroid Cancer: No  - PMH/FH of Multiple Endocrine Neoplasia (MEN) Type II: No  - PMH of Retinopathy: No  - PMH of Urinary Tract Infections/Yeast Infections: No    Immunizations:  Influenza? No  COVID? No  Pneumonia? Yes  Shingles? No  Hepatitis B? Yes  RSV? No     Lab Review  BMP  Lab Results   Component Value Date    CALCIUM 9.1 02/27/2025     02/27/2025    K 4.2 02/27/2025    CO2 29 02/27/2025     02/27/2025    BUN 16 02/27/2025    CREATININE 0.68 02/27/2025    EGFR 97 02/27/2025     LFTs  Lab Results   Component Value Date    ALT 30 (H) 02/27/2025    AST 20 02/27/2025    ALKPHOS 75 02/27/2025    BILITOT 0.4 02/27/2025 "     FLP  Lab Results   Component Value Date    TRIG 149 02/27/2025    CHOL 347 (H) 02/27/2025    LDLCALC 261 (H) 02/27/2025    HDL 55 02/27/2025       The ASCVD Risk score (Sven WESLEY, et al., 2019) failed to calculate for the following reasons:    The valid total cholesterol range is 130 to 320 mg/dL  Urine Microalbumin  Lab Results   Component Value Date    MICROALBCREA  07/25/2024      Comment:      One or more analytes used in this calculation is outside of the analytical measurement range. Calculation cannot be performed.     Albumin/Creatinine Ratio   Date Value Ref Range Status   07/25/2024   Final     Comment:     One or more analytes used in this calculation is outside of the analytical measurement range. Calculation cannot be performed.     Albumin/Creatine Ratio   Date Value Ref Range Status   12/15/2020 17.5 0.0 - 30.0 ug/mg crt Final     Weight Management  Wt Readings from Last 3 Encounters:   06/30/25 84.4 kg (186 lb)   03/31/25 88.5 kg (195 lb)   03/17/25 91.4 kg (201 lb 9.6 oz)        There is no height or weight on file to calculate BMI.   A1C  Lab Results   Component Value Date    HGBA1C 8.4 (H) 02/27/2025    HGBA1C 7.2 (H) 07/25/2024    HGBA1C 11.9 (A) 01/19/2024       Assessment/Plan     Problem List Items Addressed This Visit       Diabetes mellitus, type 2 (Multi) - Primary    Patient's goal A1c is < 7%.  Is pt at goal? No, 8.4%  Patient's SMBGs are very well controlled when checked.     Rationale for plan: Patient would like to optimize weight loss benefit of Ozempic, no ADEs at this time.    Medication Changes:  CONTINUE  Farxiga 10 mg - 1 tablet daily   INCREASE  Ozempic 2 mg/dose - once weekly   Patient to increase when finished with supply of 1 mg dose at home (~3-4 more doses)    Monitoring and Education:  Patient to get updated labs prior to next PCP visit   Advised patient to test BG if she experiences any s/sx of hypoglycemia               Follow up: I recommend diabetes care be 8  weeks.  Reports good adherence to all prescribed medications including Atorvastatin    Olesya Gomez PharmD, BCACP  Clinical Pharmacy Specialist, Primary Care     Continue all meds under the continuation of care with the referring provider and clinical pharmacy team

## 2025-08-27 ENCOUNTER — APPOINTMENT (OUTPATIENT)
Dept: PRIMARY CARE | Facility: CLINIC | Age: 65
End: 2025-08-27
Payer: COMMERCIAL

## 2025-08-27 ENCOUNTER — RESULTS FOLLOW-UP (OUTPATIENT)
Dept: PRIMARY CARE | Facility: CLINIC | Age: 65
End: 2025-08-27

## 2025-08-27 VITALS
SYSTOLIC BLOOD PRESSURE: 144 MMHG | TEMPERATURE: 97.3 F | DIASTOLIC BLOOD PRESSURE: 83 MMHG | HEART RATE: 66 BPM | BODY MASS INDEX: 40.13 KG/M2 | WEIGHT: 179 LBS | OXYGEN SATURATION: 99 % | RESPIRATION RATE: 18 BRPM

## 2025-08-27 DIAGNOSIS — E03.9 ACQUIRED HYPOTHYROIDISM: ICD-10-CM

## 2025-08-27 DIAGNOSIS — I10 PRIMARY HYPERTENSION: ICD-10-CM

## 2025-08-27 DIAGNOSIS — E11.69 TYPE 2 DIABETES MELLITUS WITH OTHER SPECIFIED COMPLICATION, WITHOUT LONG-TERM CURRENT USE OF INSULIN: Primary | ICD-10-CM

## 2025-08-27 DIAGNOSIS — E78.2 MIXED HYPERLIPIDEMIA: ICD-10-CM

## 2025-08-27 LAB
ALBUMIN SERPL-MCNC: 4.1 G/DL (ref 3.6–5.1)
ALBUMIN/CREAT UR: 7 MG/G CREAT
ALP SERPL-CCNC: 76 U/L (ref 37–153)
ALT SERPL-CCNC: 46 U/L (ref 6–29)
ANION GAP SERPL CALCULATED.4IONS-SCNC: 10 MMOL/L (CALC) (ref 7–17)
AST SERPL-CCNC: 36 U/L (ref 10–35)
BILIRUB SERPL-MCNC: 0.6 MG/DL (ref 0.2–1.2)
BUN SERPL-MCNC: 15 MG/DL (ref 7–25)
CALCIUM SERPL-MCNC: 8.7 MG/DL (ref 8.6–10.4)
CHLORIDE SERPL-SCNC: 102 MMOL/L (ref 98–110)
CHOLEST SERPL-MCNC: 268 MG/DL
CHOLEST/HDLC SERPL: 4.5 (CALC)
CO2 SERPL-SCNC: 27 MMOL/L (ref 20–32)
CREAT SERPL-MCNC: 0.76 MG/DL (ref 0.5–1.05)
CREAT UR-MCNC: 41 MG/DL (ref 20–275)
EGFRCR SERPLBLD CKD-EPI 2021: 87 ML/MIN/1.73M2
EST. AVERAGE GLUCOSE BLD GHB EST-MCNC: 137 MG/DL
EST. AVERAGE GLUCOSE BLD GHB EST-SCNC: 7.6 MMOL/L
GLUCOSE SERPL-MCNC: 106 MG/DL (ref 65–99)
HBA1C MFR BLD: 6.4 %
HDLC SERPL-MCNC: 59 MG/DL
LDLC SERPL CALC-MCNC: 185 MG/DL (CALC)
MICROALBUMIN UR-MCNC: 0.3 MG/DL
NONHDLC SERPL-MCNC: 209 MG/DL (CALC)
POTASSIUM SERPL-SCNC: 4.3 MMOL/L (ref 3.5–5.3)
PROT SERPL-MCNC: 6.9 G/DL (ref 6.1–8.1)
SODIUM SERPL-SCNC: 139 MMOL/L (ref 135–146)
T4 FREE SERPL-MCNC: 0.5 NG/DL (ref 0.8–1.8)
TRIGL SERPL-MCNC: 111 MG/DL
TSH SERPL-ACNC: 80.69 MIU/L (ref 0.4–4.5)

## 2025-08-27 PROCEDURE — 3077F SYST BP >= 140 MM HG: CPT | Performed by: NURSE PRACTITIONER

## 2025-08-27 PROCEDURE — 4010F ACE/ARB THERAPY RXD/TAKEN: CPT | Performed by: NURSE PRACTITIONER

## 2025-08-27 PROCEDURE — 99214 OFFICE O/P EST MOD 30 MIN: CPT | Performed by: NURSE PRACTITIONER

## 2025-08-27 PROCEDURE — 3079F DIAST BP 80-89 MM HG: CPT | Performed by: NURSE PRACTITIONER

## 2025-08-27 PROCEDURE — 1036F TOBACCO NON-USER: CPT | Performed by: NURSE PRACTITIONER

## 2025-08-27 RX ORDER — EZETIMIBE 10 MG/1
10 TABLET ORAL DAILY
Qty: 90 TABLET | Refills: 3 | Status: SHIPPED | OUTPATIENT
Start: 2025-08-27 | End: 2026-08-22

## 2025-09-17 ENCOUNTER — APPOINTMENT (OUTPATIENT)
Dept: PHARMACY | Facility: HOSPITAL | Age: 65
End: 2025-09-17
Payer: COMMERCIAL

## 2025-12-01 ENCOUNTER — APPOINTMENT (OUTPATIENT)
Dept: PRIMARY CARE | Facility: CLINIC | Age: 65
End: 2025-12-01
Payer: COMMERCIAL